# Patient Record
Sex: FEMALE | Race: WHITE | NOT HISPANIC OR LATINO | Employment: FULL TIME | URBAN - METROPOLITAN AREA
[De-identification: names, ages, dates, MRNs, and addresses within clinical notes are randomized per-mention and may not be internally consistent; named-entity substitution may affect disease eponyms.]

---

## 2018-09-18 ENCOUNTER — OFFICE VISIT (OUTPATIENT)
Dept: INTERNAL MEDICINE | Facility: CLINIC | Age: 28
End: 2018-09-18

## 2018-09-18 VITALS
HEART RATE: 84 BPM | HEIGHT: 64 IN | SYSTOLIC BLOOD PRESSURE: 110 MMHG | DIASTOLIC BLOOD PRESSURE: 72 MMHG | WEIGHT: 122 LBS | BODY MASS INDEX: 20.83 KG/M2

## 2018-09-18 DIAGNOSIS — Z30.011 ENCOUNTER FOR INITIAL PRESCRIPTION OF CONTRACEPTIVE PILLS: ICD-10-CM

## 2018-09-18 DIAGNOSIS — K21.9 GASTROESOPHAGEAL REFLUX DISEASE WITHOUT ESOPHAGITIS: Primary | ICD-10-CM

## 2018-09-18 DIAGNOSIS — J30.2 CHRONIC SEASONAL ALLERGIC RHINITIS, UNSPECIFIED TRIGGER: ICD-10-CM

## 2018-09-18 PROCEDURE — 99204 OFFICE O/P NEW MOD 45 MIN: CPT | Performed by: NURSE PRACTITIONER

## 2018-09-18 RX ORDER — NORETHINDRONE ACETATE AND ETHINYL ESTRADIOL 1MG-20(21)
1 KIT ORAL DAILY
Qty: 90 TABLET | Refills: 3 | Status: SHIPPED | OUTPATIENT
Start: 2018-09-18 | End: 2019-02-13 | Stop reason: SDUPTHER

## 2018-09-18 RX ORDER — AZELASTINE 1 MG/ML
2 SPRAY, METERED NASAL 2 TIMES DAILY
COMMUNITY
End: 2018-11-13 | Stop reason: SDUPTHER

## 2018-09-18 RX ORDER — FLUTICASONE PROPIONATE 50 MCG
2 SPRAY, SUSPENSION (ML) NASAL DAILY
COMMUNITY
End: 2018-11-13 | Stop reason: SDUPTHER

## 2018-09-18 RX ORDER — CETIRIZINE HYDROCHLORIDE 10 MG/1
10 TABLET ORAL DAILY
COMMUNITY
End: 2018-11-13 | Stop reason: SDUPTHER

## 2018-09-18 NOTE — PROGRESS NOTES
Chief Complaint   Patient presents with   • Establish Care   • Contraception       History of Present Illness     28 y.o.female presents to establish care, contraception mgt, and employment forms.  Prior PCP Dr. Hernández.    Hx headaches with tension or sinus headaches; hx of migraines.  Some increase in HA with employment changes.  Takes midol and helps.    Hx GERD; prilosec doesn't help.  Has started taking a probiotic and feeling better; less reflux.    Hx of seasonal allergies; takes zyrtec, flonase, and astelin; well controlled    Last pap Sep 2017; always been normal.      Review of Systems   Constitutional: Negative for chills and fever.   Respiratory: Negative for cough and shortness of breath.    Gastrointestinal: Positive for GERD. Negative for constipation, diarrhea, nausea and vomiting.   Genitourinary: Negative for difficulty urinating.   Musculoskeletal: Negative for myalgias.   Skin: Negative for rash.   Neurological: Positive for headache.         [facility]  The following portions of the patient's history were reviewed and updated as appropriate: allergies, current medications, past family history, past medical history, past social history, past surgical history and problem list.     Past Medical History:   Diagnosis Date   • Acid reflux    • Migraines       Past Surgical History:   Procedure Laterality Date   • WISDOM TOOTH EXTRACTION        Allergies   Allergen Reactions   • Amoxicillin Hives   • Penicillins Hives   • Zithromax [Azithromycin] Hives      Family History   Problem Relation Age of Onset   • No Known Problems Mother    • Hypertension Father    • Breast cancer Maternal Grandmother    • Ovarian cancer Paternal Grandmother       Social History     Social History   • Marital status: Single     Spouse name: N/A   • Number of children: N/A   • Years of education: N/A     Occupational History   • Not on file.     Social History Main Topics   • Smoking status: Never Smoker   • Smokeless tobacco:  "Never Used   • Alcohol use Yes      Comment: one glass of wine per month   • Drug use: No   • Sexual activity: Defer     Other Topics Concern   • Not on file     Social History Narrative   • No narrative on file         Current Outpatient Prescriptions:   •  azelastine (ASTELIN) 0.1 % nasal spray, 2 sprays into the nostril(s) as directed by provider 2 (Two) Times a Day. Use in each nostril as directed, Disp: , Rfl:   •  cetirizine (zyrTEC) 10 MG tablet, Take 10 mg by mouth Daily., Disp: , Rfl:   •  fluticasone (FLONASE) 50 MCG/ACT nasal spray, 2 sprays into the nostril(s) as directed by provider Daily., Disp: , Rfl:   •  Norethin Ace-Eth Estrad-FE (JUNEL FE 1/20 PO), Take  by mouth., Disp: , Rfl:   •  Probiotic Product (PROBIOTIC DAILY PO), Take  by mouth., Disp: , Rfl:     VITALS:  /72   Pulse 84   Ht 162.6 cm (64\")   Wt 55.3 kg (122 lb)   LMP 09/04/2018 (Exact Date)   BMI 20.94 kg/m²     Physical Exam   Constitutional: She is oriented to person, place, and time. She appears well-developed and well-nourished. No distress.   HENT:   Head: Normocephalic.   Right Ear: External ear normal.   Left Ear: External ear normal.   Nose: Nose normal.   Mouth/Throat: Oropharynx is clear and moist.   Eyes: Pupils are equal, round, and reactive to light. EOM are normal.   Neck: Normal range of motion. Neck supple.   Cardiovascular: Normal rate, regular rhythm, normal heart sounds and intact distal pulses.    Pulmonary/Chest: Effort normal and breath sounds normal. No respiratory distress.   Abdominal: Soft. Bowel sounds are normal. There is no tenderness.   Musculoskeletal: Normal range of motion.   Normal ROM all major joints   Neurological: She is alert and oriented to person, place, and time.   Skin: Skin is warm and dry. Capillary refill takes less than 2 seconds. No rash noted.   Psychiatric: She has a normal mood and affect. Her behavior is normal.       LABS  No results found for this or any previous " visit.    ASSESSMENT/PLAN  Hyun was seen today for establish care and contraception.    Diagnoses and all orders for this visit:    Gastroesophageal reflux disease without esophagitis  Comments:  continue probiotic    Encounter for initial prescription of contraceptive pills  -     norethindrone-ethinyl estradiol FE (JUNEL FE 1/20) 1-20 MG-MCG per tablet; Take 1 tablet by mouth Daily.    Chronic seasonal allergic rhinitis, unspecified trigger  Comments:  continue zyrtec, flonase, astelin    Completed work physical form.  She is a .    Patient refused basic blood work.      I discussed the patients findings and my recommendations with patient.     Patient was encouraged to keep me informed of any acute changes, lack of improvement, or any new concerning symptoms.    Patient voiced understanding of all instructions and denied further questions.      FOLLOW-UP  Return in about 1 year (around 9/18/2019), or if symptoms worsen or fail to improve, for Annual.    Electronically signed by:    SHEMAR Pittman  09/18/2018

## 2018-11-06 ENCOUNTER — TELEPHONE (OUTPATIENT)
Dept: INTERNAL MEDICINE | Facility: CLINIC | Age: 28
End: 2018-11-06

## 2018-11-06 NOTE — TELEPHONE ENCOUNTER
CALL FROM PT STATING THAT WHEN SHE WENT THIS MONTH TO  HER BIRTH CONTROL IT WAS A DIFFERENT BRAND SHE STATES THAT THE PHARMACY TOLD HER IT WAS PROBABLY JUST THE GENERIC BRAND BUT SHE IS WANTING TO MAKE SURE THAT THIS IS OK. CALL BACK NUMBER FOR [phone]  SHE STATES THAT SHE IS SUPPOSED TO START THESE TONIGHT

## 2018-11-06 NOTE — TELEPHONE ENCOUNTER
Let pt know that she was prescribed generic form of her birth control b/c of insurance coverage. Pt verbalized understanding

## 2018-11-13 ENCOUNTER — OFFICE VISIT (OUTPATIENT)
Dept: INTERNAL MEDICINE | Facility: CLINIC | Age: 28
End: 2018-11-13

## 2018-11-13 VITALS
TEMPERATURE: 99.8 F | BODY MASS INDEX: 21.46 KG/M2 | OXYGEN SATURATION: 98 % | SYSTOLIC BLOOD PRESSURE: 116 MMHG | HEART RATE: 86 BPM | WEIGHT: 125 LBS | DIASTOLIC BLOOD PRESSURE: 70 MMHG

## 2018-11-13 DIAGNOSIS — R05.9 COUGH: Primary | ICD-10-CM

## 2018-11-13 DIAGNOSIS — J06.9 UPPER RESPIRATORY TRACT INFECTION, UNSPECIFIED TYPE: ICD-10-CM

## 2018-11-13 PROCEDURE — 99213 OFFICE O/P EST LOW 20 MIN: CPT | Performed by: NURSE PRACTITIONER

## 2018-11-13 RX ORDER — FLUTICASONE PROPIONATE 50 MCG
2 SPRAY, SUSPENSION (ML) NASAL DAILY
Qty: 16 G | Refills: 1 | Status: SHIPPED | OUTPATIENT
Start: 2018-11-13 | End: 2021-01-25 | Stop reason: SDUPTHER

## 2018-11-13 RX ORDER — AZELASTINE 1 MG/ML
2 SPRAY, METERED NASAL DAILY
Qty: 30 ML | Refills: 0 | Status: SHIPPED | OUTPATIENT
Start: 2018-11-13 | End: 2021-01-25 | Stop reason: SDUPTHER

## 2018-11-13 RX ORDER — CETIRIZINE HYDROCHLORIDE 10 MG/1
10 TABLET ORAL DAILY
Qty: 90 TABLET | Refills: 0 | Status: SHIPPED | OUTPATIENT
Start: 2018-11-13

## 2018-11-13 NOTE — PROGRESS NOTES
Chief Complaint   Patient presents with   • Cough     Pt states has had a cold for 2 weeks and now has a cough that she can not get rid of        History of Present Illness  28 y.o.female presents for cough.    C/o nonproductive cough; onset symptoms about 2 weeks ago with URI congestion, rhinorrhea, post nasal drip cold type symptoms, but cough has lingered and will not go away. Tried zyrtec.  Positive fever.      Review of Systems   Constitutional: Positive for chills and fever. Negative for fatigue.   HENT: Positive for congestion, postnasal drip and rhinorrhea. Negative for sinus pressure, sneezing and sore throat.    Respiratory: Positive for cough. Negative for shortness of breath and wheezing.          [facility]  The following portions of the patient's history were reviewed and updated as appropriate: allergies, current medications, past family history, past medical history, past social history, past surgical history and problem list.     Past Medical History:   Diagnosis Date   • Acid reflux    • Migraines       Past Surgical History:   Procedure Laterality Date   • WISDOM TOOTH EXTRACTION        Allergies   Allergen Reactions   • Azithromycin Hives   • Amoxicillin Hives   • Penicillins Hives      Family History   Problem Relation Age of Onset   • No Known Problems Mother    • Hypertension Father    • Breast cancer Maternal Grandmother    • Ovarian cancer Paternal Grandmother       Social History     Socioeconomic History   • Marital status: Single     Spouse name: Not on file   • Number of children: Not on file   • Years of education: Not on file   • Highest education level: Not on file   Social Needs   • Financial resource strain: Not on file   • Food insecurity - worry: Not on file   • Food insecurity - inability: Not on file   • Transportation needs - medical: Not on file   • Transportation needs - non-medical: Not on file   Occupational History   • Not on file   Tobacco Use   • Smoking status: Never Smoker    • Smokeless tobacco: Never Used   Substance and Sexual Activity   • Alcohol use: Yes     Comment: one glass of wine per month   • Drug use: No   • Sexual activity: Defer   Other Topics Concern   • Not on file   Social History Narrative   • Not on file         Current Outpatient Medications:   •  azelastine (ASTELIN) 0.1 % nasal spray, 2 sprays into the nostril(s) as directed by provider 2 (Two) Times a Day. Use in each nostril as directed, Disp: , Rfl:   •  cetirizine (zyrTEC) 10 MG tablet, Take 10 mg by mouth Daily., Disp: , Rfl:   •  fluticasone (FLONASE) 50 MCG/ACT nasal spray, 2 sprays into the nostril(s) as directed by provider Daily., Disp: , Rfl:   •  norethindrone-ethinyl estradiol FE (JUNEL FE 1/20) 1-20 MG-MCG per tablet, Take 1 tablet by mouth Daily., Disp: 90 tablet, Rfl: 3  •  Probiotic Product (PROBIOTIC DAILY PO), Take  by mouth., Disp: , Rfl:     VITALS:  /70   Pulse 86   Temp 99.8 °F (37.7 °C)   Wt 56.7 kg (125 lb)   SpO2 98%   BMI 21.46 kg/m²     Physical Exam   Constitutional: She is oriented to person, place, and time. She appears well-developed and well-nourished. No distress.   HENT:   Head: Normocephalic.   Right Ear: Tympanic membrane, external ear and ear canal normal.   Left Ear: Tympanic membrane, external ear and ear canal normal.   Nose: Mucosal edema, rhinorrhea and congestion present. Right sinus exhibits no maxillary sinus tenderness and no frontal sinus tenderness. Left sinus exhibits no maxillary sinus tenderness and no frontal sinus tenderness.   Mouth/Throat: Oropharynx is clear and moist.   Cardiovascular: Normal rate, regular rhythm and normal heart sounds.   Pulmonary/Chest: Effort normal and breath sounds normal. No respiratory distress. She has no wheezes. She has no rales.   Lymphadenopathy:     She has no cervical adenopathy.   Neurological: She is alert and oriented to person, place, and time.   Skin: Skin is warm and dry.       LABS  No results found for this  or any previous visit.    ASSESSMENT/PLAN  Hyun was seen today for cough.    Diagnoses and all orders for this visit:    Cough  -     doxycycline (VIBRAMYCIN) 100 MG capsule; Take 1 capsule by mouth 2 (Two) Times a Day for 10 days.  -     benzonatate (TESSALON PERLES) 100 MG capsule; Take 1 capsule by mouth 3 (Three) Times a Day As Needed for Cough.    Upper respiratory tract infection, unspecified type  -     doxycycline (VIBRAMYCIN) 100 MG capsule; Take 1 capsule by mouth 2 (Two) Times a Day for 10 days.      If worsening of symptoms or no improvement in symptoms or fever > 101.5 patient should contact our office for further evaluation treatment or seek urgent care.    I discussed the patients findings and my recommendations with patient.     Patient was encouraged to keep me informed of any acute changes, lack of improvement, or any new concerning symptoms.    Patient voiced understanding of all instructions and denied further questions.      FOLLOW-UP  Return if symptoms worsen or fail to improve.    Electronically signed by:    SHEMAR Pittman  11/13/2018

## 2018-11-14 ENCOUNTER — TELEPHONE (OUTPATIENT)
Dept: INTERNAL MEDICINE | Facility: CLINIC | Age: 28
End: 2018-11-14

## 2018-11-14 DIAGNOSIS — R05.9 COUGH: Primary | ICD-10-CM

## 2018-11-14 RX ORDER — DOXYCYCLINE HYCLATE 100 MG/1
100 CAPSULE ORAL 2 TIMES DAILY
Qty: 20 CAPSULE | Refills: 0 | Status: SHIPPED | OUTPATIENT
Start: 2018-11-14 | End: 2018-11-14 | Stop reason: DRUGHIGH

## 2018-11-14 RX ORDER — DOXYCYCLINE 100 MG/1
100 CAPSULE ORAL EVERY 12 HOURS SCHEDULED
Qty: 20 CAPSULE | Refills: 0 | Status: SHIPPED | OUTPATIENT
Start: 2018-11-14 | End: 2018-11-24

## 2018-11-14 RX ORDER — BENZONATATE 100 MG/1
100 CAPSULE ORAL 3 TIMES DAILY PRN
Qty: 21 CAPSULE | Refills: 0 | Status: SHIPPED | OUTPATIENT
Start: 2018-11-14 | End: 2019-02-13

## 2018-11-14 NOTE — TELEPHONE ENCOUNTER
ZHEN FROM [facility] CALLED AND HAD A QUESTION ON DOXYCYCLINE BEING SENT OVER.  SHE STATED THATT THE HYCLATE IS NOT COVERED, SHE WANTS TO KNOW IF IT CAN BE SWITCHED TO MONOHYDRATE WHICH IS COVERED.  CAN BE REACHED [phone].

## 2019-02-13 ENCOUNTER — OFFICE VISIT (OUTPATIENT)
Dept: INTERNAL MEDICINE | Facility: CLINIC | Age: 29
End: 2019-02-13

## 2019-02-13 VITALS — HEIGHT: 64 IN | OXYGEN SATURATION: 98 % | WEIGHT: 122 LBS | BODY MASS INDEX: 20.83 KG/M2 | HEART RATE: 82 BPM

## 2019-02-13 DIAGNOSIS — Z30.8 ENCOUNTER FOR OTHER CONTRACEPTIVE MANAGEMENT: ICD-10-CM

## 2019-02-13 DIAGNOSIS — H60.311 ACUTE DIFFUSE OTITIS EXTERNA OF RIGHT EAR: Primary | ICD-10-CM

## 2019-02-13 PROCEDURE — 99213 OFFICE O/P EST LOW 20 MIN: CPT | Performed by: NURSE PRACTITIONER

## 2019-02-13 RX ORDER — OFLOXACIN 3 MG/ML
5 SOLUTION AURICULAR (OTIC) DAILY
Qty: 2 ML | Refills: 0 | Status: SHIPPED | OUTPATIENT
Start: 2019-02-13 | End: 2019-02-20

## 2019-02-13 RX ORDER — NORETHINDRONE ACETATE AND ETHINYL ESTRADIOL 1MG-20(21)
1 KIT ORAL DAILY
Qty: 28 TABLET | Refills: 11 | Status: SHIPPED | OUTPATIENT
Start: 2019-02-13 | End: 2020-01-27 | Stop reason: SDUPTHER

## 2019-02-13 NOTE — PROGRESS NOTES
Chief Complaint   Patient presents with   • Ear Fullness   • Earache       History of Present Illness  28 y.o.female presents for ear fullness and earache    C/o right ear fullness onset yesterday. Then woke her up in middle of night with right ear pain radiating behind ear. No recent URI sx. No fever or headaches.    Needs a refill on birth control pills.  Insurance will no longer do 90 days.  Has been taking without any problems.  History of migraines without aura; well controlled.    Review of Systems   Constitutional: Negative for chills and fever.   HENT: Positive for ear pain. Negative for congestion, ear discharge, postnasal drip, rhinorrhea, sinus pressure and sneezing.         Ear fullness   Neurological: Negative for headache.         [facility]  The following portions of the patient's history were reviewed and updated as appropriate: allergies, current medications, past family history, past medical history, past social history, past surgical history and problem list.       Past Medical History:   Diagnosis Date   • Acid reflux    • Migraines       Past Surgical History:   Procedure Laterality Date   • WISDOM TOOTH EXTRACTION        Allergies   Allergen Reactions   • Azithromycin Hives   • Amoxicillin Hives   • Penicillins Hives      Family History   Problem Relation Age of Onset   • No Known Problems Mother    • Hypertension Father    • Breast cancer Maternal Grandmother    • Ovarian cancer Paternal Grandmother             Current Outpatient Medications:   •  azelastine (ASTELIN) 0.1 % nasal spray, 2 sprays into the nostril(s) as directed by provider Daily. Use in each nostril as directed, Disp: 30 mL, Rfl: 0  •  cetirizine (zyrTEC) 10 MG tablet, Take 1 tablet by mouth Daily., Disp: 90 tablet, Rfl: 0  •  fluticasone (FLONASE) 50 MCG/ACT nasal spray, 2 sprays into the nostril(s) as directed by provider Daily., Disp: 16 g, Rfl: 1  •  norethindrone-ethinyl estradiol FE (JUNEL FE 1/20) 1-20 MG-MCG per tablet, Take  "1 tablet by mouth Daily., Disp: 90 tablet, Rfl: 3  •  Probiotic Product (PROBIOTIC DAILY PO), Take  by mouth., Disp: , Rfl:     VITALS:  Pulse 82   Ht 162.6 cm (64\")   Wt 55.3 kg (122 lb)   SpO2 98%   Breastfeeding? No   BMI 20.94 kg/m²     Physical Exam   Constitutional: She is oriented to person, place, and time. She appears well-developed and well-nourished.   HENT:   Head: Normocephalic.   Right Ear: There is swelling and tenderness. No drainage. No foreign bodies. Tympanic membrane is not injected, not perforated, not erythematous and not bulging. No middle ear effusion.   Left Ear: External ear normal.   Nose: Nose normal.   Mouth/Throat: Oropharynx is clear and moist.   Right ear external canal and pinna erythematous, dry, crusty white.    Eyes: EOM are normal. Pupils are equal, round, and reactive to light.   Neck: Neck supple.   Cardiovascular: Normal rate, regular rhythm and normal heart sounds.   Pulmonary/Chest: Effort normal and breath sounds normal.   Lymphadenopathy:     She has no cervical adenopathy.   Neurological: She is alert and oriented to person, place, and time.   Skin: Skin is warm and dry.       LABS  No results found for this or any previous visit.    ASSESSMENT/PLAN  Hyun was seen today for ear fullness and earache.    Diagnoses and all orders for this visit:    Acute diffuse otitis externa of right ear  -     ofloxacin (FLOXIN) 0.3 % otic solution; Administer 5 drops to the right ear Daily for 7 days.    Encounter for other contraceptive management  -     norethindrone-ethinyl estradiol FE (JUNEL FE 1/20) 1-20 MG-MCG per tablet; Take 1 tablet by mouth Daily.    If worsening of symptoms or no improvement in symptoms or fever > 101.5 patient should contact our office for further evaluation treatment or seek urgent care.    I discussed the patients findings and my recommendations with patient.  Patient was encouraged to keep me informed of any acute changes, lack of improvement, or any " new concerning symptoms.    Patient voiced understanding of all instructions and denied further questions.      FOLLOW-UP  Return if symptoms worsen or fail to improve.    Electronically signed by:    SHEMAR Pittman  02/13/2019

## 2019-02-22 ENCOUNTER — TELEPHONE (OUTPATIENT)
Dept: INTERNAL MEDICINE | Facility: CLINIC | Age: 29
End: 2019-02-22

## 2019-02-22 NOTE — TELEPHONE ENCOUNTER
PATIENT WAS ASKED TO CALL THE OFFICE REGARDING HER EAR THAT SHE WAS SEEN FOR BY KAREN A LITTLE OVER ONE WEEK AGO. SHE HAS HAD THIS EAR CHECKED AGAIN BY THE SCHOOL NURSE WHO TOLD HER IT IS STILL WHITE AND CLOUDY. SHE WOULD LIKE TO KNOW IF KAREN CAN ORDER THE ABX AS DISCUSSED DURING HER VISIT. SHE USED THE EAR DROPS FOR ONE WEEK AS PRESCRIBED.    CALL BACK [phone]

## 2019-02-23 DIAGNOSIS — H66.004 RECURRENT ACUTE SUPPURATIVE OTITIS MEDIA OF RIGHT EAR WITHOUT SPONTANEOUS RUPTURE OF TYMPANIC MEMBRANE: Primary | ICD-10-CM

## 2019-02-23 RX ORDER — DOXYCYCLINE HYCLATE 100 MG/1
100 CAPSULE ORAL 2 TIMES DAILY
Qty: 14 CAPSULE | Refills: 0 | Status: SHIPPED | OUTPATIENT
Start: 2019-02-23 | End: 2019-03-02

## 2019-02-23 NOTE — TELEPHONE ENCOUNTER
Let pt know I sent her doxy abx.  She will need to use a back up method on her birth control while taking this med and one week after completing.  The abx will make her birth control pills not work.

## 2019-03-04 ENCOUNTER — OFFICE VISIT (OUTPATIENT)
Dept: INTERNAL MEDICINE | Facility: CLINIC | Age: 29
End: 2019-03-04

## 2019-03-04 VITALS
BODY MASS INDEX: 20.83 KG/M2 | WEIGHT: 122 LBS | HEART RATE: 83 BPM | DIASTOLIC BLOOD PRESSURE: 74 MMHG | HEIGHT: 64 IN | TEMPERATURE: 98 F | OXYGEN SATURATION: 99 % | SYSTOLIC BLOOD PRESSURE: 116 MMHG

## 2019-03-04 DIAGNOSIS — H61.23 BILATERAL IMPACTED CERUMEN: Primary | ICD-10-CM

## 2019-03-04 PROCEDURE — 99213 OFFICE O/P EST LOW 20 MIN: CPT | Performed by: NURSE PRACTITIONER

## 2019-03-04 NOTE — PROGRESS NOTES
Chief Complaint   Patient presents with   • Ear Fullness       History of Present Illness  29 y.o.female presents for ear fullness.   Patient was seen 2/13 for acute diffuse otitis externa of right ear. Patient states that she no longer has pain or drainage of right ear but still having itching and feeling of ear fullness. She has been using debrox in right ear. She also has been using ofloxacin.  Positive decrease hearing.  No URI sx.      Review of Systems   Constitutional: Negative for chills and fever.   HENT: Positive for hearing loss. Negative for congestion, ear discharge, ear pain, postnasal drip, rhinorrhea and tinnitus.         Ear fullness    Respiratory: Negative for cough.    Neurological: Negative for dizziness and headache.       [facility]  The following portions of the patient's history were reviewed and updated as appropriate: allergies, current medications, past family history, past medical history, past social history, past surgical history and problem list.       Past Medical History:   Diagnosis Date   • Acid reflux    • Migraines       Past Surgical History:   Procedure Laterality Date   • WISDOM TOOTH EXTRACTION        Allergies   Allergen Reactions   • Azithromycin Hives   • Amoxicillin Hives   • Penicillins Hives      Family History   Problem Relation Age of Onset   • No Known Problems Mother    • Hypertension Father    • Breast cancer Maternal Grandmother    • Ovarian cancer Paternal Grandmother             Current Outpatient Medications:   •  azelastine (ASTELIN) 0.1 % nasal spray, 2 sprays into the nostril(s) as directed by provider Daily. Use in each nostril as directed, Disp: 30 mL, Rfl: 0  •  cetirizine (zyrTEC) 10 MG tablet, Take 1 tablet by mouth Daily., Disp: 90 tablet, Rfl: 0  •  fluticasone (FLONASE) 50 MCG/ACT nasal spray, 2 sprays into the nostril(s) as directed by provider Daily., Disp: 16 g, Rfl: 1  •  norethindrone-ethinyl estradiol FE (JUNEL FE 1/20) 1-20 MG-MCG per tablet, Take  "1 tablet by mouth Daily., Disp: 28 tablet, Rfl: 11  •  Probiotic Product (PROBIOTIC DAILY PO), Take  by mouth., Disp: , Rfl:     VITALS:  /74   Pulse 83   Temp 98 °F (36.7 °C)   Ht 162.6 cm (64\")   Wt 55.3 kg (122 lb)   SpO2 99%   Breastfeeding? No   BMI 20.94 kg/m²     Physical Exam   Constitutional: She appears well-developed and well-nourished. No distress.   HENT:   Right Ear: Decreased hearing is noted. cerumen impaction is present.  Left Ear: An impacted cerumen is present.  Right external canal erythematous. Dry, white, flaky bilateral pinna   Cardiovascular: Normal rate, regular rhythm and normal heart sounds.   Pulmonary/Chest: Effort normal and breath sounds normal.   Skin: She is not diaphoretic.   Nursing note and vitals reviewed.  Ear exam after bilateral ear irrigation per MA; cerumen cleared.  ever TM dull nonbulging no erythema.  Right ear canal has some mild residual erythema no drainage.    ASSESSMENT/PLAN  Hyun was seen today for ear fullness.    Diagnoses and all orders for this visit:    Bilateral impacted cerumen    cont zyrtec.  Only needs to use the debrox 1 to 2 drops about once a month to help prevent recurrent cerumen impaction.  Shower as usual.  Let ears dry out. Can apply some plain Vaseline to external pina area do not put in ear canal.  With the way her ears looked last visit and today, almost has an eczema look to it.  I want to see how things settle down now that we have wax removed from ears.  If still having any ear pain or itching she needs to let me know.  Would wear ear plugs with swimming.      I discussed the patients findings and my recommendations with patient.  Patient was encouraged to keep me informed of any acute changes, lack of improvement, or any new concerning symptoms.    Patient voiced understanding of all instructions and denied further questions.      FOLLOW-UP  Return if symptoms worsen or fail to improve.    Electronically signed by:    Bianca PETERSON" SHEMAR Levin  03/04/2019

## 2019-03-11 ENCOUNTER — TELEPHONE (OUTPATIENT)
Dept: INTERNAL MEDICINE | Facility: CLINIC | Age: 29
End: 2019-03-11

## 2019-03-11 DIAGNOSIS — N76.0 ACUTE VAGINITIS: Primary | ICD-10-CM

## 2019-03-11 RX ORDER — FLUCONAZOLE 150 MG/1
150 TABLET ORAL ONCE
Qty: 1 TABLET | Refills: 0 | Status: SHIPPED | OUTPATIENT
Start: 2019-03-11 | End: 2019-03-11

## 2019-03-11 NOTE — TELEPHONE ENCOUNTER
PT SAW KAREN AND WAS PUT ON AN ANTIBIOTIC AND NOW SHE HAS A YEAST INFECTION CAN WE CALL SOMETHING IN FOR HER  PT USES [facility]    PLEASE LET THE PT KNOW [phone]

## 2019-07-28 ENCOUNTER — OFFICE VISIT (OUTPATIENT)
Dept: INTERNAL MEDICINE | Facility: CLINIC | Age: 29
End: 2019-07-28

## 2019-07-28 VITALS
DIASTOLIC BLOOD PRESSURE: 64 MMHG | HEIGHT: 64 IN | HEART RATE: 93 BPM | BODY MASS INDEX: 21.13 KG/M2 | WEIGHT: 123.8 LBS | OXYGEN SATURATION: 99 % | SYSTOLIC BLOOD PRESSURE: 118 MMHG | RESPIRATION RATE: 16 BRPM

## 2019-07-28 DIAGNOSIS — N76.0 ACUTE VAGINITIS: Primary | ICD-10-CM

## 2019-07-28 PROCEDURE — 99213 OFFICE O/P EST LOW 20 MIN: CPT | Performed by: NURSE PRACTITIONER

## 2019-07-28 RX ORDER — FLUCONAZOLE 150 MG/1
150 TABLET ORAL DAILY
Qty: 2 TABLET | Refills: 0 | Status: SHIPPED | OUTPATIENT
Start: 2019-07-28 | End: 2019-07-30

## 2020-01-24 DIAGNOSIS — Z30.8 ENCOUNTER FOR OTHER CONTRACEPTIVE MANAGEMENT: ICD-10-CM

## 2020-01-24 RX ORDER — NORETHINDRONE ACETATE AND ETHINYL ESTRADIOL 1MG-20(21)
KIT ORAL
Qty: 28 TABLET | Refills: 10 | OUTPATIENT
Start: 2020-01-24

## 2020-01-27 ENCOUNTER — TELEPHONE (OUTPATIENT)
Dept: INTERNAL MEDICINE | Facility: CLINIC | Age: 30
End: 2020-01-27

## 2020-01-27 DIAGNOSIS — Z30.8 ENCOUNTER FOR OTHER CONTRACEPTIVE MANAGEMENT: ICD-10-CM

## 2020-01-27 RX ORDER — NORETHINDRONE ACETATE AND ETHINYL ESTRADIOL 1MG-20(21)
1 KIT ORAL DAILY
Qty: 28 TABLET | Refills: 1 | Status: SHIPPED | OUTPATIENT
Start: 2020-01-27 | End: 2020-02-06 | Stop reason: SDUPTHER

## 2020-01-27 NOTE — TELEPHONE ENCOUNTER
Patient received a letter stating that her birth control norethindrone-ethinyl estradiol FE (JUNEL FE 1/20) 1-20 MG-MCG per tablet was denied. Patient is currently out of the medication. She did inform me that her insurance has changed. ( I have updated this in this system). Please call patient at [phone] if there are any questions or concerns.

## 2020-02-06 ENCOUNTER — OFFICE VISIT (OUTPATIENT)
Dept: INTERNAL MEDICINE | Facility: CLINIC | Age: 30
End: 2020-02-06

## 2020-02-06 VITALS
HEIGHT: 64 IN | WEIGHT: 120.2 LBS | TEMPERATURE: 98 F | OXYGEN SATURATION: 99 % | HEART RATE: 109 BPM | BODY MASS INDEX: 20.52 KG/M2 | RESPIRATION RATE: 20 BRPM

## 2020-02-06 DIAGNOSIS — Z30.8 ENCOUNTER FOR OTHER CONTRACEPTIVE MANAGEMENT: ICD-10-CM

## 2020-02-06 DIAGNOSIS — J02.9 SORE THROAT: ICD-10-CM

## 2020-02-06 DIAGNOSIS — R52 BODY ACHES: ICD-10-CM

## 2020-02-06 DIAGNOSIS — J10.1 INFLUENZA B: Primary | ICD-10-CM

## 2020-02-06 LAB
EXPIRATION DATE: ABNORMAL
EXPIRATION DATE: NORMAL
FLUAV AG NPH QL: NEGATIVE
FLUBV AG NPH QL: POSITIVE
INTERNAL CONTROL: ABNORMAL
INTERNAL CONTROL: NORMAL
Lab: ABNORMAL
Lab: NORMAL
S PYO AG THROAT QL: NEGATIVE

## 2020-02-06 PROCEDURE — 87804 INFLUENZA ASSAY W/OPTIC: CPT | Performed by: NURSE PRACTITIONER

## 2020-02-06 PROCEDURE — 99213 OFFICE O/P EST LOW 20 MIN: CPT | Performed by: NURSE PRACTITIONER

## 2020-02-06 PROCEDURE — 87880 STREP A ASSAY W/OPTIC: CPT | Performed by: NURSE PRACTITIONER

## 2020-02-06 RX ORDER — OSELTAMIVIR PHOSPHATE 75 MG/1
75 CAPSULE ORAL 2 TIMES DAILY
Qty: 10 CAPSULE | Refills: 0 | Status: SHIPPED | OUTPATIENT
Start: 2020-02-06 | End: 2020-02-11

## 2020-02-06 RX ORDER — NORETHINDRONE ACETATE AND ETHINYL ESTRADIOL 1MG-20(21)
1 KIT ORAL DAILY
Qty: 84 TABLET | Refills: 3 | Status: SHIPPED | OUTPATIENT
Start: 2020-02-06 | End: 2021-01-25 | Stop reason: SDUPTHER

## 2020-02-06 NOTE — PROGRESS NOTES
Chief Complaint   Patient presents with   • Sinusitis   • Headache       History of Present Illness    Hyun Rosario is a 29 y.o. female who presents today for flu like symptoms.    Influenza   This is a new problem. Episode onset: 2 days ago. The problem occurs constantly. The problem has been gradually worsening. Associated symptoms include anorexia, chills, congestion, coughing, fatigue, headaches, myalgias and a sore throat. Pertinent negatives include no abdominal pain, arthralgias, change in bowel habit, chest pain, diaphoresis, fever, nausea, numbness, rash, swollen glands, vertigo, vomiting or weakness. Nothing aggravates the symptoms. Treatments tried: Midol. The treatment provided mild relief.       PMSF    The following portions of the patient's history were reviewed and updated as appropriate: allergies, current medications, past family history, past medical history, past social history, past surgical history and problem list.     Social History     Tobacco Use   • Smoking status: Never Smoker   • Smokeless tobacco: Never Used   Substance Use Topics   • Alcohol use: Yes     Comment: one glass of wine per month       Past Medical History:   Diagnosis Date   • Acid reflux    • Migraines        Past Surgical History:   Procedure Laterality Date   • WISDOM TOOTH EXTRACTION         Family History   Problem Relation Age of Onset   • No Known Problems Mother    • Hypertension Father    • Breast cancer Maternal Grandmother    • Ovarian cancer Paternal Grandmother        Allergies   Allergen Reactions   • Azithromycin Hives   • Amoxicillin Hives   • Penicillins Hives         Current Outpatient Medications:   •  azelastine (ASTELIN) 0.1 % nasal spray, 2 sprays into the nostril(s) as directed by provider Daily. Use in each nostril as directed, Disp: 30 mL, Rfl: 0  •  cetirizine (zyrTEC) 10 MG tablet, Take 1 tablet by mouth Daily., Disp: 90 tablet, Rfl: 0  •  fluticasone (FLONASE) 50 MCG/ACT nasal spray, 2 sprays into  "the nostril(s) as directed by provider Daily., Disp: 16 g, Rfl: 1  •  Probiotic Product (PROBIOTIC DAILY PO), Take  by mouth., Disp: , Rfl:   •  norethindrone-ethinyl estradiol FE (JUNEL FE 1/20) 1-20 MG-MCG per tablet, Take 1 tablet by mouth Daily., Disp: 84 tablet, Rfl: 3  •  oseltamivir (TAMIFLU) 75 MG capsule, Take 1 capsule by mouth 2 (Two) Times a Day for 5 days., Disp: 10 capsule, Rfl: 0    Review of Systems  Review of Systems   Constitutional: Positive for appetite change, chills and fatigue. Negative for diaphoresis and fever.   HENT: Positive for congestion, postnasal drip, rhinorrhea, sinus pressure, sinus pain and sore throat. Negative for ear pain and sneezing.    Eyes: Negative for visual disturbance.   Respiratory: Positive for cough. Negative for chest tightness, shortness of breath and wheezing.    Cardiovascular: Negative for chest pain and palpitations.   Gastrointestinal: Positive for anorexia. Negative for abdominal pain, change in bowel habit, constipation, diarrhea, nausea and vomiting.   Genitourinary: Negative for dysuria.   Musculoskeletal: Positive for myalgias. Negative for arthralgias.   Skin: Negative for color change and rash.   Neurological: Positive for headaches. Negative for dizziness, vertigo, weakness, light-headedness and numbness.   Psychiatric/Behavioral: Negative for sleep disturbance.       Vitals:  Vitals:    02/06/20 1008   Pulse: 109   Resp: 20   Temp: 98 °F (36.7 °C)   TempSrc: Temporal   SpO2: 99%   Weight: 54.5 kg (120 lb 3.2 oz)   Height: 162.6 cm (64\")   PainSc:   5   PainLoc: Throat       Physical Exam  Physical Exam   Constitutional: She is oriented to person, place, and time. She appears well-developed and well-nourished.   HENT:   Head: Normocephalic and atraumatic.   Right Ear: Tympanic membrane, external ear and ear canal normal.   Left Ear: Tympanic membrane, external ear and ear canal normal.   Nose: Mucosal edema, rhinorrhea and congestion present. No nasal " deformity. Right sinus exhibits maxillary sinus tenderness. Right sinus exhibits no frontal sinus tenderness. Left sinus exhibits maxillary sinus tenderness. Left sinus exhibits no frontal sinus tenderness.   Mouth/Throat: Uvula is midline and mucous membranes are normal. Posterior oropharyngeal erythema present. No oropharyngeal exudate or posterior oropharyngeal edema. No tonsillar exudate.   Eyes: Pupils are equal, round, and reactive to light. Conjunctivae are normal.   Neck: Trachea normal and phonation normal.   Cardiovascular: Normal rate, regular rhythm and normal heart sounds.   Pulmonary/Chest: Effort normal and breath sounds normal. No respiratory distress. She has no decreased breath sounds. She has no wheezes. She has no rhonchi. She has no rales.   Lymphadenopathy:        Head (right side): No submental, no submandibular, no tonsillar, no preauricular and no posterior auricular adenopathy present.        Head (left side): No submental, no submandibular, no tonsillar, no preauricular and no posterior auricular adenopathy present.     She has no cervical adenopathy.   Neurological: She is alert and oriented to person, place, and time.   Skin: Skin is warm and dry. No rash noted.   Psychiatric: She has a normal mood and affect. Her behavior is normal.   Nursing note and vitals reviewed.      Labs  Results for orders placed or performed in visit on 02/06/20   POC Influenza A / B   Result Value Ref Range    Rapid Influenza A Ag Negative Negative    Rapid Influenza B Ag Positive (A) Negative    Internal Control Passed Passed    Lot Number [id number]     Expiration Date 5/13/2022    POC Rapid Strep A   Result Value Ref Range    Rapid Strep A Screen Negative Negative, VALID, INVALID, Not Performed    Internal Control Passed Passed    Lot Number [id number]     Expiration Date 4/30/2021        Assessment/Plan    Hyun was seen today for sinusitis and headache.    Diagnoses and all orders for this  visit:    Influenza B  -     oseltamivir (TAMIFLU) 75 MG capsule; Take 1 capsule by mouth 2 (Two) Times a Day for 5 days.    Body aches  -     POC Influenza A / B    Sore throat  -     POC Rapid Strep A    Discussed viral vs. Bacterial etiology. Influenza is viral. Take antiviral medications as prescribed. Patient advised to seek symptomatic relief with OTC cough and cold preparations as indicated, Tylenol/Ibuprofen for fever, headache, body aches. Encouraged adequate rest and fluid intake. Advised that flu can be contagious before symptoms begin and up to 5-7 after symptoms first noticed. Universal precautions recommended including appropriate handwashing and hygiene measures to prevent the spread of virus. Follow-up for new, worsening, or persistent symptoms.    Plan of care reviewed with patient at conclusion of today's visit. Patient education was provided regarding diagnosis, management, and prescribed or recommended OTC medications. Patient was informed to notify office of any new, worsening, or persistent symptoms. Patient verbalized understanding and agreement with plan of care.     Follow-Up  Return if symptoms worsen or fail to improve.      Electronically Signed By:  SHEMAR Soria/Transcription Disclaimer:  Much of this encounter note is an electronic transcription/translation of spoken language to printed text.  The electronic translation of spoken language may permit erroneous, or at times, nonsensical words or phrases to be inadvertently transcribed.  Although I have reviewed the note for such errors, some may still exist.

## 2021-01-21 DIAGNOSIS — Z30.8 ENCOUNTER FOR OTHER CONTRACEPTIVE MANAGEMENT: ICD-10-CM

## 2021-01-21 RX ORDER — NORETHINDRONE ACETATE AND ETHINYL ESTRADIOL 1MG-20(21)
KIT ORAL
Qty: 84 TABLET | Refills: 2 | OUTPATIENT
Start: 2021-01-21

## 2021-01-25 ENCOUNTER — OFFICE VISIT (OUTPATIENT)
Dept: INTERNAL MEDICINE | Facility: CLINIC | Age: 31
End: 2021-01-25

## 2021-01-25 DIAGNOSIS — Z30.8 ENCOUNTER FOR OTHER CONTRACEPTIVE MANAGEMENT: ICD-10-CM

## 2021-01-25 DIAGNOSIS — J30.89 ENVIRONMENTAL AND SEASONAL ALLERGIES: Primary | ICD-10-CM

## 2021-01-25 DIAGNOSIS — Z30.41 ENCOUNTER FOR SURVEILLANCE OF CONTRACEPTIVE PILLS: ICD-10-CM

## 2021-01-25 PROCEDURE — 99442 PR PHYS/QHP TELEPHONE EVALUATION 11-20 MIN: CPT | Performed by: NURSE PRACTITIONER

## 2021-01-25 RX ORDER — FLUTICASONE PROPIONATE 50 MCG
2 SPRAY, SUSPENSION (ML) NASAL DAILY
Qty: 16 G | Refills: 5 | Status: SHIPPED | OUTPATIENT
Start: 2021-01-25 | End: 2021-09-22

## 2021-01-25 RX ORDER — NORETHINDRONE ACETATE AND ETHINYL ESTRADIOL 1MG-20(21)
1 KIT ORAL DAILY
Qty: 84 TABLET | Refills: 3 | OUTPATIENT
Start: 2021-01-25 | End: 2022-01-25

## 2021-01-25 RX ORDER — AZELASTINE 1 MG/ML
2 SPRAY, METERED NASAL DAILY
Qty: 30 ML | Refills: 5 | Status: SHIPPED | OUTPATIENT
Start: 2021-01-25 | End: 2021-09-22

## 2021-01-25 RX ORDER — NORETHINDRONE ACETATE AND ETHINYL ESTRADIOL 1MG-20(21)
1 KIT ORAL DAILY
Qty: 84 TABLET | Refills: 3 | Status: SHIPPED | OUTPATIENT
Start: 2021-01-25 | End: 2021-06-23 | Stop reason: SDUPTHER

## 2021-01-25 NOTE — PROGRESS NOTES
Chief Complaint   Patient presents with   • Contraception   • Allergies     You have chosen to receive care through a telephone visit. Do you consent to use a telephone visit for your medical care today? Yes    History of Present Illness    30 y.o.female presents for contraception and allergies medication refill.  Contraception patient takes Junel VALERIE daily without difficulties.  Non-smoker.  No history of DVT or blood clots.  No breast complaints.  Occasional migraine no aura.  Menstrual periods are regular without difficulties.  No pelvic pain, flank pain vaginal discharge or dysuria.  Her allergies have been bothering her more than usual this winter/year with rhinorrhea nasal congestion.  Takes Flonase and Astelin nasal spray.  Tolerates well no epistaxis.  Needs refill.      ROS: any positive referenced above.   Other ROS negative.      [facility]  The following portions of the patient's history were reviewed and updated as appropriate: allergies, current medications, past family history, past medical history, past social history, past surgical history and problem list.     Past Medical History:   Diagnosis Date   • Acid reflux    • Migraines       Allergies   Allergen Reactions   • Azithromycin Hives   • Amoxicillin Hives   • Penicillins Hives      Social History     Tobacco Use   • Smoking status: Never Smoker   • Smokeless tobacco: Never Used   Substance Use Topics   • Alcohol use: Yes     Comment: one glass of wine per month   • Drug use: No         Current Outpatient Medications:   •  azelastine (ASTELIN) 0.1 % nasal spray, 2 sprays into the nostril(s) as directed by provider Daily. Use in each nostril as directed, Disp: 30 mL, Rfl: 0  •  cetirizine (zyrTEC) 10 MG tablet, Take 1 tablet by mouth Daily., Disp: 90 tablet, Rfl: 0  •  fluticasone (FLONASE) 50 MCG/ACT nasal spray, 2 sprays into the nostril(s) as directed by provider Daily., Disp: 16 g, Rfl: 1  •  norethindrone-ethinyl estradiol FE (JUNEL FE 1/20)  1-20 MG-MCG per tablet, Take 1 tablet by mouth Daily., Disp: 84 tablet, Rfl: 3  •  Probiotic Product (PROBIOTIC DAILY PO), Take  by mouth., Disp: , Rfl:     VITALS:  Physical Exam  telehealth  Assessment and Plan    Diagnoses and all orders for this visit:    1. Environmental and seasonal allergies (Primary)  -     azelastine (ASTELIN) 0.1 % nasal spray; 2 sprays into the nostril(s) as directed by provider Daily. Use in each nostril as directed  Dispense: 30 mL; Refill: 5  -     fluticasone (FLONASE) 50 MCG/ACT nasal spray; 2 sprays into the nostril(s) as directed by provider Daily.  Dispense: 16 g; Refill: 5    2. Encounter for other contraceptive management  -     norethindrone-ethinyl estradiol FE (Junel FE 1/20) 1-20 MG-MCG per tablet; Take 1 tablet by mouth Daily.  Dispense: 84 tablet; Refill: 3      This visit has been rescheduled as a phone visit to comply with patient safety concerns in accordance with CDC recommendations. Total time of discussion was 15 minutes.  '    Follow Up   Return in about 1 year (around 1/25/2022), or if symptoms worsen or fail to improve, for Annual.      I discussed the patients findings and my recommendations with patient.  Patient was encouraged to keep me informed of any acute changes, lack of improvement, or any new concerning symptoms.  Patient voiced understanding of all instructions and denied further questions.    Electronically signed by:    SHEMAR Pittman  01/25/2021    EMR Dragon/Transcription Disclaimer:  Much of this encounter note is an electronic transcription/translation of spoken language to printed text.  The electronic translation of spoken language may permit erroneous, or at times, nonsensical words or phrases to be inadvertently transcribed.  Although I have reviewed the note for such errors, some may still exist

## 2021-02-22 ENCOUNTER — OFFICE VISIT (OUTPATIENT)
Dept: INTERNAL MEDICINE | Facility: CLINIC | Age: 31
End: 2021-02-22

## 2021-02-22 VITALS
BODY MASS INDEX: 21.26 KG/M2 | HEIGHT: 63 IN | WEIGHT: 120 LBS | DIASTOLIC BLOOD PRESSURE: 80 MMHG | SYSTOLIC BLOOD PRESSURE: 128 MMHG | TEMPERATURE: 97.1 F | OXYGEN SATURATION: 98 % | HEART RATE: 138 BPM

## 2021-02-22 DIAGNOSIS — S16.1XXA ACUTE STRAIN OF NECK MUSCLE, INITIAL ENCOUNTER: ICD-10-CM

## 2021-02-22 DIAGNOSIS — M77.8 LEFT SHOULDER TENDONITIS: Primary | ICD-10-CM

## 2021-02-22 PROCEDURE — 99213 OFFICE O/P EST LOW 20 MIN: CPT | Performed by: NURSE PRACTITIONER

## 2021-02-22 NOTE — PROGRESS NOTES
Chief Complaint   Patient presents with   • Neck Pain     Pt c/o pain in neck, shoulder, and arm. States been going on for few weeks, having tingling/numbness, abdirahman during working out/doing activities   • Arm Pain       History of Present Illness      Hyun Rosario is a 30 y.o. 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 When in person symptoms not as sever as she is pushing cart and looking down all day at ipad. Also does photography and carrying heavy camera which may have exacerbated symptoms yesterday. Thinks symptoms have been causing headaches. Feels she has been stressed a lot over the last year and concerned that ongoing symptoms may be due to tension. Has chronic migraines for which she is using a diary to document triggers and frequency. Taking midol daily over the last couple of days with some relief of symptoms. Trying to do yoga to counter symptoms. Spasms occur at nighttime. Notes numbness and tingling sensation of lower arm and fingers. Neck stiffness more pronounced last night, unable to get to relax. Used ice with mild relief.     Answers for HPI/ROS submitted by the patient on 2/22/2021   What is the primary reason for your visit?: Other  Please describe your symptoms.: Neck, shoulder, pain. Numb and tingling in left arm and hand. Migraines  Have you had these symptoms before?: No  How long have you been having these symptoms?: Greater than 2 weeks  Please list any medications you are currently taking for this condition.: Midol  Please describe any probable cause for these symptoms. 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 Virtual is really rough because I'm sitting at a computer all day. In person is rough because I'm pushing a cart around and still looking at an iPad screen for a lot of the day. I've been trying  to do yoga to relax it but I guess it's possible that that hurt it. Needless to say, I've been extremely stressed the past year, so it could have to do with tension. I get migraines so it didn't necessarily make me think something was wrong, but I have been getting migraines more frequently recently.    Review of Systems  Review of Systems   Eyes: Negative for visual disturbance.   Respiratory: Negative for cough and shortness of breath.    Cardiovascular: Negative for chest pain and palpitations.   Musculoskeletal: Positive for myalgias, neck pain and neck stiffness. Negative for arthralgias, back pain and joint swelling.   Neurological: Positive for numbness. Negative for dizziness, tremors, weakness, light-headedness and headache.         [facility]  The following portions of the patient's history were reviewed and updated as appropriate: allergies, current medications, past family history, past medical history, past social history, past surgical history and problem list.     Past Medical History:   Diagnosis Date   • Acid reflux    • Migraines      Social History     Tobacco Use   • Smoking status: Never Smoker   • Smokeless tobacco: Never Used   Substance Use Topics   • Alcohol use: Yes     Comment: one glass of wine per month     Past Surgical History:   Procedure Laterality Date   • WISDOM TOOTH EXTRACTION       Family History   Problem Relation Age of Onset   • No Known Problems Mother    • Hypertension Father    • Breast cancer Maternal Grandmother    • Ovarian cancer Paternal Grandmother      Allergies   Allergen Reactions   • Azithromycin Hives   • Amoxicillin Hives   • Penicillins Hives         Current Outpatient Medications:   •  azelastine (ASTELIN) 0.1 % nasal spray, 2 sprays into the nostril(s) as directed by provider Daily. Use in each nostril as directed, Disp: 30 mL, Rfl: 5  •  cetirizine (zyrTEC) 10 MG tablet, Take 1 tablet by mouth Daily., Disp: 90 tablet, Rfl: 0  •  fluticasone (FLONASE) 50 MCG/ACT  "nasal spray, 2 sprays into the nostril(s) as directed by provider Daily., Disp: 16 g, Rfl: 5  •  norethindrone-ethinyl estradiol FE (Junel FE 1/20) 1-20 MG-MCG per tablet, Take 1 tablet by mouth Daily., Disp: 84 tablet, Rfl: 3  •  Probiotic Product (PROBIOTIC DAILY PO), Take  by mouth., Disp: , Rfl:     Objective   Vitals:  Vitals:    02/22/21 1250   BP: 128/80   Pulse: (!) 138   Temp: 97.1 °F (36.2 °C)   SpO2: 98%   Weight: 54.4 kg (120 lb)   Height: 160 cm (63\")   PainSc:   4       Physical Exam  Physical Exam  Vitals signs and nursing note reviewed.   Constitutional:       Appearance: Normal appearance. She is well-developed. She is not ill-appearing or toxic-appearing.   HENT:      Head: Normocephalic and atraumatic.   Eyes:      Conjunctiva/sclera: Conjunctivae normal.      Pupils: Pupils are equal, round, and reactive to light.   Neck:      Musculoskeletal: Normal range of motion and neck supple. Normal range of motion. Pain with movement and muscular tenderness present. No edema, erythema, neck rigidity, crepitus, injury, torticollis or spinous process tenderness.   Pulmonary:      Effort: Pulmonary effort is normal.   Musculoskeletal:         General: Tenderness present.      Left shoulder: She exhibits normal range of motion, no tenderness, no bony tenderness, no swelling, no effusion, no crepitus, no deformity, no laceration, no pain, no spasm, normal pulse and normal strength.      Cervical back: She exhibits tenderness. She exhibits normal range of motion, no bony tenderness, no swelling, no edema, no deformity, no laceration, no pain, no spasm and normal pulse.      Comments: Full ROM of cervical spine and left shoulder. There is tenderness to palpation of trapezius musculature. No decreased  strength noted on PE. Normal arc sign without pain, normal shoulder shrug without pain, normal drop arm test.   Skin:     General: Skin is warm and dry.      Findings: No rash.   Neurological:      Mental " Status: She is alert and oriented to person, place, and time.      Sensory: Sensation is intact. No sensory deficit.      Motor: Motor function is intact. No weakness, tremor or abnormal muscle tone.      Coordination: Coordination is intact.   Psychiatric:         Behavior: Behavior normal. Behavior is cooperative.       Assessment and Plan    Diagnoses and all orders for this visit:    1. Left shoulder tendonitis (Primary)  Symptoms consistent with left shoulder tendinitis likely due to nonergonomic positioning for work.  Recommend continued use of ibuprofen 600-800 mg as needed, no more than 2400 mg daily; heat/ice; rest; topical analgesics such as capsaicin or icy hot.  Continue with routine stretching maneuvers and avoidance of heavy lifting, pushing, pulling until symptoms subside.    2. Acute strain of neck muscle, initial encounter  Patient encouraged to try to perform work tasks at eye level to avoid further strain of neck.  Encouraged continued routine stretching maneuvers to avoid further injury.  May continue to use ibuprofen/acetaminophen for acute pain as well as massage therapy, chiropractic manipulation, heat/ice, rest, and topical analgesics as needed.  Follow-up in office if symptoms worsening or no improvement as expected.    I spent 15 minutes caring for Hyun on this date of service. This time includes time spent by me in the following activities:preparing for the visit, performing a medically appropriate examination and/or evaluation , counseling and educating the patient/family/caregiver and documenting information in the medical record.    Follow Up   Return if symptoms worsen or fail to improve.    Plan of care reviewed with patient at conclusion of today's visit. Patient education was provided regarding diagnosis, management, and prescribed or recommended OTC medications. Patient was informed to notify office of any new, worsening, or persistent symptoms. Patient verbalized understanding and  agreement with plan of care.     Electronically Signed By:  SHEMAR Soria  02/22/2021    EMR Dragon/Transcription Disclaimer:  Please note that portions of this encounter note were completed using electronic transcription/translation of spoken language to printed text.  The electronic transcription/translation of spoken language may permit erroneous, or at times, nonsensical words or phrases to be inadvertently transcribed.  Although I have reviewed the note for such errors, some may still exist in this documentation.

## 2021-03-05 ENCOUNTER — TELEPHONE (OUTPATIENT)
Dept: INTERNAL MEDICINE | Facility: CLINIC | Age: 31
End: 2021-03-05

## 2021-03-05 DIAGNOSIS — M54.2 NECK PAIN: Primary | ICD-10-CM

## 2021-03-05 NOTE — TELEPHONE ENCOUNTER
Caller: [facility] [facility] ON BEHALF OF    Hyun Rosario    Relationship: Self    Best call back number: [phone]     What orders are you requesting (i.e. lab or imaging): IMAGING OF NECK - CT SCAN- RECOMMENDED BY [facility] PH: [phone] -      In what timeframe would the patient need to come in: ASAP     Where will you receive your lab/imaging services:   [address]  FAX: [phone]     Additional notes: PATIENT REQUESTED CALL WHEN ORDERS ARE ACTIVE.  ELGIN STATED THAT SHE HAS GONE TO PT TWICE AND THEY RECOMMENDED IMAGING BEFORE THEY GO ANY FURTHER.

## 2021-03-11 ENCOUNTER — HOSPITAL ENCOUNTER (OUTPATIENT)
Dept: CT IMAGING | Facility: HOSPITAL | Age: 31
Discharge: HOME | End: 2021-03-11
Admitting: NURSE PRACTITIONER

## 2021-03-11 ENCOUNTER — TELEPHONE (OUTPATIENT)
Dept: INTERNAL MEDICINE | Facility: CLINIC | Age: 31
End: 2021-03-11

## 2021-03-11 DIAGNOSIS — M54.2 NECK PAIN: ICD-10-CM

## 2021-03-11 PROCEDURE — 72125 CT NECK SPINE W/O DYE: CPT

## 2021-03-11 NOTE — TELEPHONE ENCOUNTER
Caller: Hyun Rosario    Relationship: Self    Best call back number: [phone]    Caller requesting test results: YES    What test was performed: CT SCAN    When was the test performed: 03/11    Where was the test performed:  [facility]    Additional notes:

## 2021-03-13 ENCOUNTER — TELEPHONE (OUTPATIENT)
Dept: INTERNAL MEDICINE | Facility: CLINIC | Age: 31
End: 2021-03-13

## 2021-03-13 RX ORDER — TIZANIDINE HYDROCHLORIDE 2 MG/1
2 CAPSULE, GELATIN COATED ORAL 3 TIMES DAILY PRN
Qty: 60 CAPSULE | Refills: 1 | Status: SHIPPED | OUTPATIENT
Start: 2021-03-13

## 2021-03-13 NOTE — TELEPHONE ENCOUNTER
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 PHARMACY IS [address] PATIENT CAN BE REACHED [phone]

## 2021-06-23 ENCOUNTER — OFFICE VISIT (OUTPATIENT)
Dept: OBSTETRICS AND GYNECOLOGY | Facility: CLINIC | Age: 31
End: 2021-06-23

## 2021-06-23 VITALS
HEIGHT: 63 IN | DIASTOLIC BLOOD PRESSURE: 72 MMHG | WEIGHT: 127 LBS | SYSTOLIC BLOOD PRESSURE: 124 MMHG | BODY MASS INDEX: 22.5 KG/M2

## 2021-06-23 DIAGNOSIS — Z30.41 ENCOUNTER FOR SURVEILLANCE OF CONTRACEPTIVE PILLS: ICD-10-CM

## 2021-06-23 DIAGNOSIS — Z01.419 WOMEN'S ANNUAL ROUTINE GYNECOLOGICAL EXAMINATION: Primary | ICD-10-CM

## 2021-06-23 PROBLEM — Z80.3 FAMILY HISTORY OF BREAST CANCER: Status: ACTIVE | Noted: 2021-06-23

## 2021-06-23 PROCEDURE — 99385 PREV VISIT NEW AGE 18-39: CPT | Performed by: OBSTETRICS & GYNECOLOGY

## 2021-06-23 RX ORDER — NORETHINDRONE ACETATE AND ETHINYL ESTRADIOL 1MG-20(21)
1 KIT ORAL DAILY
Qty: 84 TABLET | Refills: 3 | Status: SHIPPED | OUTPATIENT
Start: 2021-06-23 | End: 2022-06-14 | Stop reason: SDUPTHER

## 2021-06-23 NOTE — PROGRESS NOTES
GYN Annual Exam     CC - Here for annual exam.        HPI  Hyun Rosario is a 31 y.o. female, , who presents for annual well woman exam. Patient's last menstrual period was 06/10/2021 (exact date). Periods are regular every 28 days lasting 3 days .  Dysmenorrhea:mild to moderate better on OCP's, occurring premenstrually and first 1-2 days of flow.  Patient reports problems with: bloating usually right before her periods and the first day of the cycle and PMS.  Also notices dryness on the right labia. Vagasil does help to some degree. There were no changes to her medical or surgical history since her last visit.. Partner Status: Marital Status: single and same partner for 11 days.  She is sexually active. She has not had new partners since her last STD testing. She does not desire STD testing. Pt had to see PCP for BC due to insurance issue but wants Dr. Thomas to refill today.     Additional OB/GYN History   Current contraception: contraceptive methods: OCP (estrogen/progesterone)  Desires to: continue contraception  Last Pap :   Last Completed Pap Smear          Ordered - PAP SMEAR (Every 3 Years) Ordered on 2019  Done    2017  Done - in [place]              History of abnormal Pap smear: no  Family history of uterine, colon, breast, or ovarian cancer: yes - MGM breast cancer and Her mom is Brca positive and had prophylactic mastectomy at age 40.   Performs monthly Self-Breast Exam: yes  Exercises Regularly:yes  Feelings of Anxiety or Depression: yes - no meds needed   Tobacco Usage?: No   OB History        0    Para   0    Term   0       0    AB   0    Living   0       SAB   0    TAB   0    Ectopic   0    Molar   0    Multiple   0    Live Births   0                Health Maintenance   Topic Date Due   • Annual Gynecologic Pelvic and Breast Exam  Never done   • ANNUAL PHYSICAL  Never done   • HEPATITIS C SCREENING  Never done   • INFLUENZA VACCINE  2021   • PAP  "SMEAR  07/24/2022   • TDAP/TD VACCINES (2 - Td or Tdap) 04/16/2023   • COVID-19 Vaccine  Completed   • Pneumococcal Vaccine 0-64  Aged Out       The additional following portions of the patient's history were reviewed and updated as appropriate: current medications, past family history, past medical history, past social history, past surgical history and problem list.    Review of Systems   Constitutional: Negative.    HENT: Negative.    Eyes: Negative.    Respiratory: Negative.    Cardiovascular: Negative.    Gastrointestinal: Negative.    Endocrine: Negative.    Musculoskeletal: Negative.    Allergic/Immunologic: Negative.    Neurological: Negative.    Hematological: Negative.    Psychiatric/Behavioral: Negative.          I have reviewed and agree with the HPI, ROS, and historical information as entered above. Lucinda Thomas MD    Objective   /72   Ht 160 cm (63\")   Wt 57.6 kg (127 lb)   LMP 06/10/2021 (Exact Date)   Breastfeeding No   BMI 22.50 kg/m²     Physical Exam  Vitals and nursing note reviewed. Exam conducted with a chaperone present.   Constitutional:       Appearance: Normal appearance. She is well-developed.   HENT:      Head: Normocephalic and atraumatic.   Neck:      Thyroid: No thyroid mass or thyromegaly.   Cardiovascular:      Rate and Rhythm: Normal rate and regular rhythm.      Heart sounds: No murmur heard.     Pulmonary:      Effort: Pulmonary effort is normal. No retractions.      Breath sounds: Normal breath sounds. No wheezing, rhonchi or rales.   Chest:      Chest wall: No mass or tenderness.      Breasts:         Right: Normal. No mass, nipple discharge, skin change or tenderness.         Left: Normal. No mass, nipple discharge, skin change or tenderness.   Abdominal:      General: Bowel sounds are normal. There is no distension.      Palpations: Abdomen is soft. Abdomen is not rigid. There is no mass.      Tenderness: There is no abdominal tenderness. There is no " guarding or rebound.      Hernia: No hernia is present. There is no hernia in the left inguinal area or right inguinal area.   Genitourinary:     General: Normal vulva.      Exam position: Lithotomy position.      Pubic Area: No rash.       Labia:         Right: No rash, tenderness or lesion.         Left: No rash, tenderness or lesion.       Urethra: No urethral pain or urethral swelling.      Vagina: Normal. No vaginal discharge or lesions.      Cervix: No cervical motion tenderness, discharge, lesion or cervical bleeding.      Uterus: Normal. Not enlarged, not fixed and not tender.       Adnexa:         Right: No mass, tenderness or fullness.          Left: No mass, tenderness or fullness.        Rectum: No external hemorrhoid.   Musculoskeletal:      Cervical back: Normal range of motion. No muscular tenderness.   Skin:     General: Skin is warm and dry.   Neurological:      Mental Status: She is alert and oriented to person, place, and time.   Psychiatric:         Mood and Affect: Mood normal.         Behavior: Behavior normal.            Assessment and Plan    Problem List Items Addressed This Visit     None      Visit Diagnoses     Women's annual routine gynecological examination    -  Primary    Relevant Orders    Pap IG, Rfx HPV ASCU    Encounter for surveillance of contraceptive pills        Relevant Medications    norethindrone-ethinyl estradiol FE (Junel FE 1/20) 1-20 MG-MCG per tablet          1. GYN annual well woman exam.   2. Encouraged use of condoms for STD prevention.  3. OCP's/Vaginal Ring - Discussed side effects of nausea, BTB, headaches, breast tenderness and slight weight gain in the first three cycles.  Understands risks of blood clots, stroke, and theoretical risk of breast cancer.  Denies family history of blood clots.  4. Reviewed monthly self breast exams.  Instructed to call with lumps, pain, or breast discharge.    5. Reviewed exercise as a preventative health measures.    6. Recommended use of Vitamin D replacement and getting adequate calcium in her diet. (1500mg)  7. Reccommended Flu Vaccine in Fall of each year.  8. RTC in 1 year or PRN with problems      Lucinda Thomas MD  06/23/2021

## 2021-07-07 ENCOUNTER — TRANSCRIBE ORDERS (OUTPATIENT)
Dept: LAB | Facility: HOSPITAL | Age: 31
End: 2021-07-07

## 2021-07-07 ENCOUNTER — LAB (OUTPATIENT)
Dept: LAB | Facility: HOSPITAL | Age: 31
End: 2021-07-07

## 2021-07-07 DIAGNOSIS — G43.719 INTRACTABLE CHRONIC MIGRAINE WITHOUT AURA AND WITHOUT STATUS MIGRAINOSUS: Primary | ICD-10-CM

## 2021-07-07 DIAGNOSIS — R53.83 OTHER FATIGUE: ICD-10-CM

## 2021-07-07 DIAGNOSIS — G43.719 INTRACTABLE CHRONIC MIGRAINE WITHOUT AURA AND WITHOUT STATUS MIGRAINOSUS: ICD-10-CM

## 2021-07-07 LAB
ALBUMIN SERPL-MCNC: 4.5 G/DL (ref 3.5–5.2)
ALBUMIN/GLOB SERPL: 1.7 G/DL
ALP SERPL-CCNC: 41 U/L (ref 39–117)
ALT SERPL W P-5'-P-CCNC: 14 U/L (ref 1–33)
ANION GAP SERPL CALCULATED.3IONS-SCNC: 10.4 MMOL/L (ref 5–15)
AST SERPL-CCNC: 20 U/L (ref 1–32)
BASOPHILS # BLD AUTO: 0.02 10*3/MM3 (ref 0–0.2)
BASOPHILS NFR BLD AUTO: 0.3 % (ref 0–1.5)
BILIRUB SERPL-MCNC: 0.3 MG/DL (ref 0–1.2)
BUN SERPL-MCNC: 7 MG/DL (ref 6–20)
BUN/CREAT SERPL: 13.2 (ref 7–25)
CALCIUM SPEC-SCNC: 9.1 MG/DL (ref 8.6–10.5)
CHLORIDE SERPL-SCNC: 105 MMOL/L (ref 98–107)
CO2 SERPL-SCNC: 23.6 MMOL/L (ref 22–29)
CREAT SERPL-MCNC: 0.53 MG/DL (ref 0.57–1)
CRP SERPL-MCNC: <0.3 MG/DL (ref 0–0.5)
DEPRECATED RDW RBC AUTO: 45.1 FL (ref 37–54)
EOSINOPHIL # BLD AUTO: 0.18 10*3/MM3 (ref 0–0.4)
EOSINOPHIL NFR BLD AUTO: 2.8 % (ref 0.3–6.2)
ERYTHROCYTE [DISTWIDTH] IN BLOOD BY AUTOMATED COUNT: 14.3 % (ref 12.3–15.4)
ERYTHROCYTE [SEDIMENTATION RATE] IN BLOOD: 24 MM/HR (ref 0–20)
FOLATE SERPL-MCNC: >20 NG/ML (ref 4.78–24.2)
GFR SERPL CREATININE-BSD FRML MDRD: 136 ML/MIN/1.73
GLOBULIN UR ELPH-MCNC: 2.7 GM/DL
GLUCOSE SERPL-MCNC: 82 MG/DL (ref 65–99)
HCT VFR BLD AUTO: 39.8 % (ref 34–46.6)
HGB BLD-MCNC: 12.9 G/DL (ref 12–15.9)
IMM GRANULOCYTES # BLD AUTO: 0.01 10*3/MM3 (ref 0–0.05)
IMM GRANULOCYTES NFR BLD AUTO: 0.2 % (ref 0–0.5)
LYMPHOCYTES # BLD AUTO: 1.87 10*3/MM3 (ref 0.7–3.1)
LYMPHOCYTES NFR BLD AUTO: 29.5 % (ref 19.6–45.3)
MAGNESIUM SERPL-MCNC: 2.1 MG/DL (ref 1.6–2.6)
MCH RBC QN AUTO: 28 PG (ref 26.6–33)
MCHC RBC AUTO-ENTMCNC: 32.4 G/DL (ref 31.5–35.7)
MCV RBC AUTO: 86.5 FL (ref 79–97)
MONOCYTES # BLD AUTO: 0.48 10*3/MM3 (ref 0.1–0.9)
MONOCYTES NFR BLD AUTO: 7.6 % (ref 5–12)
NEUTROPHILS NFR BLD AUTO: 3.77 10*3/MM3 (ref 1.7–7)
NEUTROPHILS NFR BLD AUTO: 59.6 % (ref 42.7–76)
NRBC BLD AUTO-RTO: 0 /100 WBC (ref 0–0.2)
PLATELET # BLD AUTO: 300 10*3/MM3 (ref 140–450)
PMV BLD AUTO: 11.7 FL (ref 6–12)
POTASSIUM SERPL-SCNC: 4 MMOL/L (ref 3.5–5.2)
PROT SERPL-MCNC: 7.2 G/DL (ref 6–8.5)
RBC # BLD AUTO: 4.6 10*6/MM3 (ref 3.77–5.28)
SODIUM SERPL-SCNC: 139 MMOL/L (ref 136–145)
TSH SERPL DL<=0.05 MIU/L-ACNC: 2.67 UIU/ML (ref 0.27–4.2)
VIT B12 BLD-MCNC: 382 PG/ML (ref 211–946)
WBC # BLD AUTO: 6.33 10*3/MM3 (ref 3.4–10.8)

## 2021-07-07 PROCEDURE — 82746 ASSAY OF FOLIC ACID SERUM: CPT

## 2021-07-07 PROCEDURE — 80053 COMPREHEN METABOLIC PANEL: CPT

## 2021-07-07 PROCEDURE — 85652 RBC SED RATE AUTOMATED: CPT

## 2021-07-07 PROCEDURE — 82607 VITAMIN B-12: CPT

## 2021-07-07 PROCEDURE — 83735 ASSAY OF MAGNESIUM: CPT

## 2021-07-07 PROCEDURE — 36415 COLL VENOUS BLD VENIPUNCTURE: CPT

## 2021-07-07 PROCEDURE — 86140 C-REACTIVE PROTEIN: CPT

## 2021-07-07 PROCEDURE — 84443 ASSAY THYROID STIM HORMONE: CPT

## 2021-07-07 PROCEDURE — 85025 COMPLETE CBC W/AUTO DIFF WBC: CPT

## 2021-07-24 ENCOUNTER — OFFICE VISIT (OUTPATIENT)
Dept: INTERNAL MEDICINE | Facility: CLINIC | Age: 31
End: 2021-07-24

## 2021-07-24 VITALS
WEIGHT: 130.6 LBS | DIASTOLIC BLOOD PRESSURE: 70 MMHG | HEART RATE: 104 BPM | BODY MASS INDEX: 23.14 KG/M2 | OXYGEN SATURATION: 99 % | SYSTOLIC BLOOD PRESSURE: 102 MMHG | TEMPERATURE: 98.4 F | HEIGHT: 63 IN

## 2021-07-24 DIAGNOSIS — F41.1 GENERALIZED ANXIETY DISORDER: Primary | ICD-10-CM

## 2021-07-24 PROCEDURE — 99213 OFFICE O/P EST LOW 20 MIN: CPT | Performed by: NURSE PRACTITIONER

## 2021-07-24 RX ORDER — MONTELUKAST SODIUM 10 MG/1
TABLET ORAL
COMMUNITY
Start: 2021-07-01

## 2021-07-24 RX ORDER — SUMATRIPTAN 100 MG/1
TABLET, FILM COATED ORAL
COMMUNITY
Start: 2021-06-30 | End: 2021-09-22

## 2021-07-24 RX ORDER — PREDNISONE 20 MG/1
TABLET ORAL
COMMUNITY
Start: 2021-07-22 | End: 2021-08-17

## 2021-07-24 RX ORDER — FAMOTIDINE 20 MG/1
TABLET, FILM COATED ORAL
COMMUNITY
Start: 2021-07-22 | End: 2021-08-17

## 2021-07-24 RX ORDER — SERTRALINE HYDROCHLORIDE 25 MG/1
25 TABLET, FILM COATED ORAL DAILY
Qty: 30 TABLET | Refills: 0 | Status: SHIPPED | OUTPATIENT
Start: 2021-07-24 | End: 2021-08-17

## 2021-07-24 NOTE — PROGRESS NOTES
Chief Complaint   Patient presents with   • Medication Problem     questions with regard to medications contraindicating   • Anxiety     therapist suggested medication for generalized anxiety disorder       History of Present Illness    31 y.o.female presents for anxiety. Chronic onset aprox 6 years. Has been seeing therapist about same time. Has never been on anx meds.  Recently feeling like anxiety getting worse. Therapist recommended consideration of meds, so she wanted to discuss med options. Not much depression; mostly anxiety.     Review of Systems   Constitutional: Negative for fatigue.   Respiratory: Negative for shortness of breath.    Cardiovascular: Negative for chest pain and palpitations.   Psychiatric/Behavioral: Negative for self-injury, suicidal ideas and depressed mood. The patient is nervous/anxious.           [facility]  The following portions of the patient's history were reviewed and updated as appropriate: allergies, current medications, past family history, past medical history, past social history, past surgical history and problem list.     Past Medical History:   Diagnosis Date   • Acid reflux    • Generalized anxiety disorder 7/27/2021   • H/O seasonal allergies     grass and dust   • Migraines       Allergies   Allergen Reactions   • Azithromycin Hives   • Amoxicillin Hives      Social History     Tobacco Use   • Smoking status: Never Smoker   • Smokeless tobacco: Never Used   Vaping Use   • Vaping Use: Never used   Substance Use Topics   • Alcohol use: Yes     Comment: one glass of wine per month   • Drug use: No     Past Surgical History:   Procedure Laterality Date   • WISDOM TOOTH EXTRACTION        Family History   Problem Relation Age of Onset   • No Known Problems Mother    • Hypertension Father    • Breast cancer Maternal Grandmother    • Ovarian cancer Paternal Grandmother            Current Outpatient Medications:   •  azelastine (ASTELIN) 0.1 % nasal spray, 2 sprays into the  "nostril(s) as directed by provider Daily. Use in each nostril as directed, Disp: 30 mL, Rfl: 5  •  cetirizine (zyrTEC) 10 MG tablet, Take 1 tablet by mouth Daily., Disp: 90 tablet, Rfl: 0  •  famotidine (PEPCID) 20 MG tablet, , Disp: , Rfl:   •  fluticasone (FLONASE) 50 MCG/ACT nasal spray, 2 sprays into the nostril(s) as directed by provider Daily., Disp: 16 g, Rfl: 5  •  montelukast (SINGULAIR) 10 MG tablet, , Disp: , Rfl:   •  norethindrone-ethinyl estradiol FE (Junel FE 1/20) 1-20 MG-MCG per tablet, Take 1 tablet by mouth Daily., Disp: 84 tablet, Rfl: 3  •  predniSONE (DELTASONE) 20 MG tablet, , Disp: , Rfl:   •  Probiotic Product (PROBIOTIC DAILY PO), Take  by mouth., Disp: , Rfl:   •  SUMAtriptan (IMITREX) 100 MG tablet, , Disp: , Rfl:   •  TiZANidine (ZANAFLEX) 2 MG capsule, Take 1 capsule by mouth 3 (Three) Times a Day As Needed for Muscle Spasms., Disp: 60 capsule, Rfl: 1    VITALS:  /70   Pulse 104   Temp 98.4 °F (36.9 °C)   Ht 160 cm (63\")   Wt 59.2 kg (130 lb 9.6 oz)   SpO2 99%   Breastfeeding No   BMI 23.13 kg/m²     Physical Exam  Vitals reviewed.   HENT:      Head: Normocephalic.   Cardiovascular:      Rate and Rhythm: Normal rate and regular rhythm.      Heart sounds: Normal heart sounds.   Pulmonary:      Effort: Pulmonary effort is normal. No respiratory distress.      Breath sounds: Normal breath sounds.   Neurological:      General: No focal deficit present.      Mental Status: She is alert and oriented to person, place, and time.   Psychiatric:         Mood and Affect: Mood is anxious.         Speech: Speech normal.         Behavior: Behavior normal.         Assessment and Plan    Diagnoses and all orders for this visit:    1. Generalized anxiety disorder (Primary)  -     sertraline (ZOLOFT) 25 MG tablet; Take 1 tablet by mouth Daily.  Dispense: 30 tablet; Refill: 0        Counseled patient regarding multimodal approach with healthy sleep, social activities, counseling, and " medications.  Reviewed medication options and common side effects. Patient would like to proceed with zoloft.  Advised some meds can take several weeks to see improvement in symptoms, and is important to take medication as prescribed.  Some SSRI especially in younger populations can worsen depression symptoms.  If any thoughts of self harm or suicidal ideations, pt should contact our office for immediate evaluation or seek emergency care.    I discussed the patients findings and my recommendations with patient.  Patient was encouraged to keep me informed of any acute changes, lack of improvement, or any new concerning symptoms.  Patient voiced understanding of all instructions and denied further questions.      Follow Up   Return in about 4 weeks (around 8/21/2021), or if symptoms worsen or fail to improve, for Recheck.      Electronically signed by:    SHEMAR Pittman  07/24/2021

## 2021-07-27 PROBLEM — F41.1 GENERALIZED ANXIETY DISORDER: Status: ACTIVE | Noted: 2021-07-27

## 2021-08-09 ENCOUNTER — TELEPHONE (OUTPATIENT)
Dept: INTERNAL MEDICINE | Facility: CLINIC | Age: 31
End: 2021-08-09

## 2021-08-09 NOTE — TELEPHONE ENCOUNTER
Caller: Hyun Rosario    Relationship: Self    Best call back number: [phone]   What medications are you currently taking:   Current Outpatient Medications on File Prior to Visit   Medication Sig Dispense Refill   • azelastine (ASTELIN) 0.1 % nasal spray 2 sprays into the nostril(s) as directed by provider Daily. Use in each nostril as directed 30 mL 5   • cetirizine (zyrTEC) 10 MG tablet Take 1 tablet by mouth Daily. 90 tablet 0   • famotidine (PEPCID) 20 MG tablet      • fluticasone (FLONASE) 50 MCG/ACT nasal spray 2 sprays into the nostril(s) as directed by provider Daily. 16 g 5   • montelukast (SINGULAIR) 10 MG tablet      • norethindrone-ethinyl estradiol FE (Junel FE 1/20) 1-20 MG-MCG per tablet Take 1 tablet by mouth Daily. 84 tablet 3   • predniSONE (DELTASONE) 20 MG tablet      • Probiotic Product (PROBIOTIC DAILY PO) Take  by mouth.     • sertraline (ZOLOFT) 25 MG tablet Take 1 tablet by mouth Daily. 30 tablet 0   • SUMAtriptan (IMITREX) 100 MG tablet      • TiZANidine (ZANAFLEX) 2 MG capsule Take 1 capsule by mouth 3 (Three) Times a Day As Needed for Muscle Spasms. 60 capsule 1     No current facility-administered medications on file prior to visit.        When did you start taking these medications: 2 WEEKS AGO    Which medication are you concerned about: ZOLOFT    Who prescribed you this medication: CHASITY SMITH    What are your concerns: VERTIGO/ DIZZINESS EVEN WHEN LAYING DOWN    How long have you been taking these medications: ABOUT 2 WEEKS    How long have you had these concerns: STARTED FRIDAY

## 2021-08-10 NOTE — TELEPHONE ENCOUNTER
Let pt know she can cut the zoloft tab in half to see if helps.  If does not resolve then stop zoloft to see if sx go away. Will need to schedule FU appt to further discuss other med options.

## 2021-08-12 NOTE — TELEPHONE ENCOUNTER
Left voice mail that Cayla said she can cut the zoloft in half and if it does not resolve then stop the zoloft to see if the symptoms go away but will need a follow up appointment to discuss other medication options

## 2021-08-16 NOTE — TELEPHONE ENCOUNTER
Patient has been on day 3 with taking half a zoloft she would like to stop taking it all together. She would like to know when she can stop taking a half and stop completley     Please advise

## 2021-08-17 ENCOUNTER — TELEPHONE (OUTPATIENT)
Dept: INTERNAL MEDICINE | Facility: CLINIC | Age: 31
End: 2021-08-17

## 2021-08-17 ENCOUNTER — OFFICE VISIT (OUTPATIENT)
Dept: INTERNAL MEDICINE | Facility: CLINIC | Age: 31
End: 2021-08-17

## 2021-08-17 VITALS
HEART RATE: 111 BPM | SYSTOLIC BLOOD PRESSURE: 112 MMHG | OXYGEN SATURATION: 97 % | WEIGHT: 133.6 LBS | TEMPERATURE: 96.7 F | HEIGHT: 63 IN | BODY MASS INDEX: 23.67 KG/M2 | DIASTOLIC BLOOD PRESSURE: 78 MMHG

## 2021-08-17 DIAGNOSIS — F41.1 GENERALIZED ANXIETY DISORDER: Primary | ICD-10-CM

## 2021-08-17 PROCEDURE — 99213 OFFICE O/P EST LOW 20 MIN: CPT | Performed by: NURSE PRACTITIONER

## 2021-08-17 RX ORDER — PAROXETINE 10 MG/1
10 TABLET, FILM COATED ORAL EVERY MORNING
Qty: 30 TABLET | Refills: 0 | Status: SHIPPED | OUTPATIENT
Start: 2021-08-17 | End: 2021-09-16

## 2021-08-17 NOTE — TELEPHONE ENCOUNTER
Caller: Hyun Rosario    Relationship: Self    Best call back number: [phone]      Which medication are you concerned about: sertraline (ZOLOFT) 25 MG tablet    Who prescribed you this medication: ELIZABETH SMITH    What are your concerns: PATIENT WOULD LIKE TO KNOW HOW LONG SHE IS SUPPOSED TO TAKE 1/2 TABLET BEFORE SHE CAN QUIT THE MEDICATION.

## 2021-08-17 NOTE — PROGRESS NOTES
Chief Complaint   Patient presents with   • Medication Problem     vertigo since starting Zoloft       History of Present Illness    31 y.o.female presents for med problem for anxiety.  Seen back July 24 for anxiety   Chronic onset aprox 6 years. Has been seeing therapist about same time. Had never been on anx meds.  Recently feeling like anxiety getting worse. Therapist recommended consideration of meds, so she wanted to discuss med options. Not much depression; mostly anxiety.   Started on zoloft; within a few days started having dizziness vertigo; interfered with activities driving. Has since been weaning back down on zoloft and stopped. Would like to try different med.  Her mom takes zoloft and her dad takes paxil.    Review of Systems   Constitutional: Negative for chills and fever.   Cardiovascular: Negative for chest pain and palpitations.   Neurological: Positive for dizziness.   Psychiatric/Behavioral: The patient is nervous/anxious.          [facility]  The following portions of the patient's history were reviewed and updated as appropriate: allergies, current medications, past family history, past medical history, past social history, past surgical history and problem list.     Past Medical History:   Diagnosis Date   • Acid reflux    • Generalized anxiety disorder 7/27/2021   • H/O seasonal allergies     grass and dust   • Migraines       Allergies   Allergen Reactions   • Azithromycin Hives   • Amoxicillin Hives      Social History     Tobacco Use   • Smoking status: Never Smoker   • Smokeless tobacco: Never Used   Vaping Use   • Vaping Use: Never used   Substance Use Topics   • Alcohol use: Yes     Comment: one glass of wine per month   • Drug use: No     Past Surgical History:   Procedure Laterality Date   • WISDOM TOOTH EXTRACTION        Family History   Problem Relation Age of Onset   • No Known Problems Mother    • Hypertension Father    • Breast cancer Maternal Grandmother    • Ovarian cancer  "Paternal Grandmother            Current Outpatient Medications:   •  azelastine (ASTELIN) 0.1 % nasal spray, 2 sprays into the nostril(s) as directed by provider Daily. Use in each nostril as directed, Disp: 30 mL, Rfl: 5  •  cetirizine (zyrTEC) 10 MG tablet, Take 1 tablet by mouth Daily., Disp: 90 tablet, Rfl: 0  •  fluticasone (FLONASE) 50 MCG/ACT nasal spray, 2 sprays into the nostril(s) as directed by provider Daily., Disp: 16 g, Rfl: 5  •  montelukast (SINGULAIR) 10 MG tablet, , Disp: , Rfl:   •  norethindrone-ethinyl estradiol FE (Junel FE 1/20) 1-20 MG-MCG per tablet, Take 1 tablet by mouth Daily., Disp: 84 tablet, Rfl: 3  •  Probiotic Product (PROBIOTIC DAILY PO), Take  by mouth., Disp: , Rfl:   •  SUMAtriptan (IMITREX) 100 MG tablet, , Disp: , Rfl:   •  TiZANidine (ZANAFLEX) 2 MG capsule, Take 1 capsule by mouth 3 (Three) Times a Day As Needed for Muscle Spasms., Disp: 60 capsule, Rfl: 1  •  sertraline (ZOLOFT) 25 MG tablet, Take 1 tablet by mouth Daily., Disp: 30 tablet, Rfl: 0    VITALS:  /78   Pulse 111   Temp 96.7 °F (35.9 °C)   Ht 160 cm (63\")   Wt 60.6 kg (133 lb 9.6 oz)   SpO2 97%   BMI 23.67 kg/m²     Physical Exam  HENT:      Head: Normocephalic.   Cardiovascular:      Rate and Rhythm: Normal rate and regular rhythm.      Heart sounds: Normal heart sounds.   Pulmonary:      Effort: Pulmonary effort is normal. No respiratory distress.      Breath sounds: Normal breath sounds.   Neurological:      Mental Status: She is alert and oriented to person, place, and time.   Psychiatric:         Mood and Affect: Mood normal.           Assessment and Plan    Diagnoses and all orders for this visit:    1. Generalized anxiety disorder (Primary)  -     PARoxetine (Paxil) 10 MG tablet; Take 1 tablet by mouth Every Morning.  Dispense: 30 tablet; Refill: 0        I discussed the patients findings and my recommendations with patient.  Patient was encouraged to keep me informed of any acute changes, lack " of improvement, or any new concerning symptoms.  Patient voiced understanding of all instructions and denied further questions.      Follow Up   Return in about 4 weeks (around 9/14/2021), or if symptoms worsen or fail to improve, for Recheck.      Electronically signed by:    SHEMAR Pittman  08/17/2021

## 2021-09-15 DIAGNOSIS — F41.1 GENERALIZED ANXIETY DISORDER: ICD-10-CM

## 2021-09-16 RX ORDER — PAROXETINE 10 MG/1
TABLET, FILM COATED ORAL
Qty: 30 TABLET | Refills: 0 | Status: SHIPPED | OUTPATIENT
Start: 2021-09-16 | End: 2021-09-22 | Stop reason: SDUPTHER

## 2021-09-22 ENCOUNTER — OFFICE VISIT (OUTPATIENT)
Dept: INTERNAL MEDICINE | Facility: CLINIC | Age: 31
End: 2021-09-22

## 2021-09-22 VITALS
OXYGEN SATURATION: 98 % | SYSTOLIC BLOOD PRESSURE: 128 MMHG | DIASTOLIC BLOOD PRESSURE: 64 MMHG | TEMPERATURE: 97.4 F | HEART RATE: 110 BPM | WEIGHT: 140 LBS | BODY MASS INDEX: 24.8 KG/M2

## 2021-09-22 DIAGNOSIS — F41.1 GENERALIZED ANXIETY DISORDER: ICD-10-CM

## 2021-09-22 PROCEDURE — 99213 OFFICE O/P EST LOW 20 MIN: CPT | Performed by: NURSE PRACTITIONER

## 2021-09-22 RX ORDER — AZELASTINE HYDROCHLORIDE, FLUTICASONE PROPIONATE 137; 50 UG/1; UG/1
SPRAY, METERED NASAL
COMMUNITY
Start: 2021-08-27

## 2021-09-22 RX ORDER — RIZATRIPTAN BENZOATE 10 MG/1
TABLET ORAL
COMMUNITY
Start: 2021-08-20

## 2021-09-22 RX ORDER — PAROXETINE HYDROCHLORIDE 20 MG/1
20 TABLET, FILM COATED ORAL NIGHTLY
Qty: 30 TABLET | Refills: 1 | Status: SHIPPED | OUTPATIENT
Start: 2021-09-22 | End: 2021-11-22 | Stop reason: SDUPTHER

## 2021-09-22 NOTE — PROGRESS NOTES
Chief Complaint   Patient presents with   • Anxiety     follow up       History of Present Illness    31 y.o.female presents for anxiety 4 week follow up.  Chronic anxiety years.  Anxiety  Presents for follow-up visit. Symptoms include nervous/anxious behavior. Patient reports no chest pain, dizziness, shortness of breath or suicidal ideas. Symptoms occur occasionally. The severity of symptoms is mild. The quality of sleep is good.     Compliance with medications is %.   started on paxil last visit. Tried zoloft before that but made her dizzy so stopped.    Review of Systems   Respiratory: Negative for shortness of breath.    Cardiovascular: Negative for chest pain.   Neurological: Negative for dizziness and light-headedness.   Psychiatric/Behavioral: Negative for suicidal ideas. The patient is nervous/anxious.        [facility]  The following portions of the patient's history were reviewed and updated as appropriate: allergies, current medications, past family history, past medical history, past social history, past surgical history and problem list.     Past Medical History:   Diagnosis Date   • Acid reflux    • Generalized anxiety disorder 7/27/2021   • H/O seasonal allergies     grass and dust   • Migraines       Allergies   Allergen Reactions   • Azithromycin Hives   • Amoxicillin Hives      Social History     Tobacco Use   • Smoking status: Never Smoker   • Smokeless tobacco: Never Used   Vaping Use   • Vaping Use: Never used   Substance Use Topics   • Alcohol use: Yes     Comment: one glass of wine per month   • Drug use: No     Past Surgical History:   Procedure Laterality Date   • WISDOM TOOTH EXTRACTION        Family History   Problem Relation Age of Onset   • No Known Problems Mother    • Hypertension Father    • Breast cancer Maternal Grandmother    • Ovarian cancer Paternal Grandmother            Current Outpatient Medications:   •  cetirizine (zyrTEC) 10 MG tablet, Take 1 tablet by mouth Daily.,  Disp: 90 tablet, Rfl: 0  •  montelukast (SINGULAIR) 10 MG tablet, , Disp: , Rfl:   •  norethindrone-ethinyl estradiol FE (Junel FE 1/20) 1-20 MG-MCG per tablet, Take 1 tablet by mouth Daily., Disp: 84 tablet, Rfl: 3  •  PARoxetine (PAXIL) 10 MG tablet, TAKE ONE TABLET BY MOUTH EVERY MORNING, Disp: 30 tablet, Rfl: 0  •  Probiotic Product (PROBIOTIC DAILY PO), Take  by mouth., Disp: , Rfl:   •  TiZANidine (ZANAFLEX) 2 MG capsule, Take 1 capsule by mouth 3 (Three) Times a Day As Needed for Muscle Spasms., Disp: 60 capsule, Rfl: 1  •  Azelastine-Fluticasone 137-50 MCG/ACT suspension, , Disp: , Rfl:   •  rizatriptan (MAXALT) 10 MG tablet, , Disp: , Rfl:     VITALS:  /64   Pulse 110   Temp 97.4 °F (36.3 °C)   Wt 63.5 kg (140 lb)   SpO2 98%   BMI 24.80 kg/m²     Physical Exam  Vitals reviewed.   Eyes:      Pupils: Pupils are equal, round, and reactive to light.   Pulmonary:      Effort: Pulmonary effort is normal. No respiratory distress.   Neurological:      General: No focal deficit present.      Mental Status: She is alert and oriented to person, place, and time.   Psychiatric:         Mood and Affect: Mood normal.         Behavior: Behavior normal.         Assessment and Plan    Diagnoses and all orders for this visit:    1. Generalized anxiety disorder  -     PARoxetine (PAXIL) 20 MG tablet; Take 1 tablet by mouth Every Night.  Dispense: 30 tablet; Refill: 1    doing some better on paxil. Pt would like to try increased dose.     I discussed the patients findings and my recommendations with patient.  Patient was encouraged to keep me informed of any acute changes, lack of improvement, or any new concerning symptoms.  Patient voiced understanding of all instructions and denied further questions.      Follow Up   Return in about 8 weeks (around 11/17/2021), or if symptoms worsen or fail to improve.      Electronically signed by:    SHEMAR Pittman  09/22/2021

## 2021-11-22 ENCOUNTER — OFFICE VISIT (OUTPATIENT)
Dept: INTERNAL MEDICINE | Facility: CLINIC | Age: 31
End: 2021-11-22

## 2021-11-22 VITALS
HEART RATE: 88 BPM | OXYGEN SATURATION: 97 % | BODY MASS INDEX: 26.4 KG/M2 | WEIGHT: 149 LBS | SYSTOLIC BLOOD PRESSURE: 122 MMHG | HEIGHT: 63 IN | DIASTOLIC BLOOD PRESSURE: 82 MMHG

## 2021-11-22 DIAGNOSIS — F32.1 CURRENT MODERATE EPISODE OF MAJOR DEPRESSIVE DISORDER WITHOUT PRIOR EPISODE (HCC): ICD-10-CM

## 2021-11-22 DIAGNOSIS — F41.1 GENERALIZED ANXIETY DISORDER: Primary | ICD-10-CM

## 2021-11-22 PROCEDURE — 99214 OFFICE O/P EST MOD 30 MIN: CPT | Performed by: NURSE PRACTITIONER

## 2021-11-22 RX ORDER — PAROXETINE 30 MG/1
30 TABLET, FILM COATED ORAL NIGHTLY
Qty: 90 TABLET | Refills: 0 | Status: SHIPPED | OUTPATIENT
Start: 2021-11-22 | End: 2022-03-15 | Stop reason: SDUPTHER

## 2021-11-22 NOTE — PROGRESS NOTES
Chief Complaint   Patient presents with   • Anxiety     depression       History of Present Illness    31 y.o.female presents for anxiety depression.    Anxiety chronic onset months; better on prozac, but now having some depression. thinks anxiety was masking depression. No SI. Would like to increase dose.  Review of Systems   Constitutional: Negative for fatigue.   Respiratory: Negative for shortness of breath.    Cardiovascular: Negative for chest pain and palpitations.   Psychiatric/Behavioral: Positive for depressed mood. Negative for self-injury and suicidal ideas. The patient is nervous/anxious.          [facility]  The following portions of the patient's history were reviewed and updated as appropriate: allergies, current medications, past family history, past medical history, past social history, past surgical history and problem list.    Past Medical History:   Diagnosis Date   • Acid reflux    • Generalized anxiety disorder 7/27/2021   • H/O seasonal allergies     grass and dust   • Migraines       Allergies   Allergen Reactions   • Azithromycin Hives   • Amoxicillin Hives      Social History     Tobacco Use   • Smoking status: Never Smoker   • Smokeless tobacco: Never Used   Vaping Use   • Vaping Use: Never used   Substance Use Topics   • Alcohol use: Yes     Comment: one glass of wine per month   • Drug use: No     Past Surgical History:   Procedure Laterality Date   • WISDOM TOOTH EXTRACTION        Family History   Problem Relation Age of Onset   • No Known Problems Mother    • Hypertension Father    • Breast cancer Maternal Grandmother    • Ovarian cancer Paternal Grandmother            Current Outpatient Medications:   •  Azelastine-Fluticasone 137-50 MCG/ACT suspension, , Disp: , Rfl:   •  cetirizine (zyrTEC) 10 MG tablet, Take 1 tablet by mouth Daily., Disp: 90 tablet, Rfl: 0  •  montelukast (SINGULAIR) 10 MG tablet, , Disp: , Rfl:   •  norethindrone-ethinyl estradiol FE (Junel FE 1/20) 1-20 MG-MCG per  "tablet, Take 1 tablet by mouth Daily., Disp: 84 tablet, Rfl: 3  •  PARoxetine (PAXIL) 20 MG tablet, Take 1 tablet by mouth Every Night., Disp: 30 tablet, Rfl: 1  •  Probiotic Product (PROBIOTIC DAILY PO), Take  by mouth., Disp: , Rfl:   •  rizatriptan (MAXALT) 10 MG tablet, , Disp: , Rfl:   •  TiZANidine (ZANAFLEX) 2 MG capsule, Take 1 capsule by mouth 3 (Three) Times a Day As Needed for Muscle Spasms., Disp: 60 capsule, Rfl: 1    VITALS:  Pulse 88   Ht 160 cm (63\")   Wt 67.6 kg (149 lb)   SpO2 97%   Breastfeeding No   BMI 26.39 kg/m²     Physical Exam  Vitals reviewed.   Constitutional:       General: She is not in acute distress.  Eyes:      Pupils: Pupils are equal, round, and reactive to light.   Cardiovascular:      Rate and Rhythm: Normal rate and regular rhythm.      Heart sounds: Normal heart sounds.   Pulmonary:      Effort: Pulmonary effort is normal.      Breath sounds: Normal breath sounds.   Neurological:      General: No focal deficit present.      Mental Status: She is alert and oriented to person, place, and time.   Psychiatric:         Mood and Affect: Mood normal.         Behavior: Behavior normal.         Result Review :            Assessment and Plan    Diagnoses and all orders for this visit:    1. Generalized anxiety disorder (Primary)  -     PARoxetine (PAXIL) 30 MG tablet; Take 1 tablet by mouth Every Night.  Dispense: 90 tablet; Refill: 0    2. Current moderate episode of major depressive disorder without prior episode (HCC)  -     PARoxetine (PAXIL) 30 MG tablet; Take 1 tablet by mouth Every Night.  Dispense: 90 tablet; Refill: 0      Discussed treatment options side effects risks benefits. Pt would like to increase prozac dose. Discussed if any worsening symptoms need to contact me for follow up or seek emergency care.    I discussed the patients findings and my recommendations with patient.  Patient was encouraged to keep me informed of any acute changes, lack of improvement, or any " new concerning symptoms.  Patient voiced understanding of all instructions and denied further questions.      Follow Up   Return in about 4 weeks (around 12/20/2021), or if symptoms worsen or fail to improve.      Electronically signed by:    SHEMAR Pittman  11/22/2021

## 2021-12-03 DIAGNOSIS — F41.1 GENERALIZED ANXIETY DISORDER: ICD-10-CM

## 2021-12-03 RX ORDER — PAROXETINE HYDROCHLORIDE 20 MG/1
TABLET, FILM COATED ORAL
Qty: 30 TABLET | Refills: 0 | Status: SHIPPED | OUTPATIENT
Start: 2021-12-03 | End: 2022-03-13

## 2021-12-03 NOTE — TELEPHONE ENCOUNTER
Pt states needs the 20 mg until her winter break, due to side effects she is waiting to start the 30 mg

## 2021-12-06 ENCOUNTER — TELEMEDICINE (OUTPATIENT)
Dept: INTERNAL MEDICINE | Facility: CLINIC | Age: 31
End: 2021-12-06

## 2021-12-06 DIAGNOSIS — R09.81 NASAL CONGESTION: ICD-10-CM

## 2021-12-06 DIAGNOSIS — R05.9 COUGH: Primary | ICD-10-CM

## 2021-12-06 DIAGNOSIS — J02.9 SORE THROAT: ICD-10-CM

## 2021-12-06 LAB
EXPIRATION DATE: ABNORMAL
FLUAV AG NPH QL: NEGATIVE
FLUBV AG NPH QL: NEGATIVE
INTERNAL CONTROL: ABNORMAL
Lab: 2780

## 2021-12-06 PROCEDURE — 99213 OFFICE O/P EST LOW 20 MIN: CPT | Performed by: INTERNAL MEDICINE

## 2021-12-06 PROCEDURE — U0004 COV-19 TEST NON-CDC HGH THRU: HCPCS | Performed by: INTERNAL MEDICINE

## 2021-12-06 PROCEDURE — 87804 INFLUENZA ASSAY W/OPTIC: CPT | Performed by: INTERNAL MEDICINE

## 2021-12-06 NOTE — PROGRESS NOTES
Internal Medicine Acute Visit    Chief Complaint   Patient presents with   • Headache     Since friday and gotten worse since, cough, congestion N/V        HPI  Ms. Rosario verbally consented to a telehealth visit. She was seen via telehealth using real-time video conferencing technology. This visit was requested because of the COVID-19 pandemic. She was located at her home, and provider was located at [facility] at [address]. The telehealth visit started 9:50am and ended at 9:59am EST. She notes symptoms starting on Friday with sore throat, cough, congestion, headache. Saturday she had N/V x1, chills. She has been checking her temperature and has not had a fever. Symptoms are worsening overall. She takes dayquil with improvement. COVID test was negative Saturday. No further N/V/D. Her boyfriend also now has congestion.         Review of Systems  Review of Systems   Constitutional: Positive for chills and fatigue. Negative for fever.   HENT: Positive for congestion, postnasal drip, rhinorrhea and sore throat (improving).    Respiratory: Positive for cough and chest tightness (heaviness). Negative for shortness of breath.    Gastrointestinal: Positive for nausea and vomiting.        Medications  Current Outpatient Medications on File Prior to Visit   Medication Sig Dispense Refill   • Azelastine-Fluticasone 137-50 MCG/ACT suspension      • cetirizine (zyrTEC) 10 MG tablet Take 1 tablet by mouth Daily. 90 tablet 0   • montelukast (SINGULAIR) 10 MG tablet      • norethindrone-ethinyl estradiol FE (Junel FE 1/20) 1-20 MG-MCG per tablet Take 1 tablet by mouth Daily. 84 tablet 3   • PARoxetine (PAXIL) 20 MG tablet TAKE ONE TABLET BY MOUTH ONCE NIGHTLY 30 tablet 0   • PARoxetine (PAXIL) 30 MG tablet Take 1 tablet by mouth Every Night. 90 tablet 0   • Probiotic Product (PROBIOTIC DAILY PO) Take  by mouth.     • rizatriptan (MAXALT) 10 MG tablet      • TiZANidine (ZANAFLEX) 2 MG capsule Take 1  capsule by mouth 3 (Three) Times a Day As Needed for Muscle Spasms. 60 capsule 1     No current facility-administered medications on file prior to visit.        Allergies  Allergies   Allergen Reactions   • Azithromycin Hives   • Amoxicillin Hives       PMH  Past Medical History:   Diagnosis Date   • Acid reflux    • Generalized anxiety disorder 7/27/2021   • H/O seasonal allergies     grass and dust   • Migraines        Objective  There were no vitals taken for this visit.     Physical Exam  Physical Exam  Vitals and nursing note reviewed.   Constitutional:       General: She is not in acute distress.     Appearance: She is well-developed. She is ill-appearing. She is not toxic-appearing.   HENT:      Head: Normocephalic and atraumatic.   Eyes:      Conjunctiva/sclera: Conjunctivae normal.   Pulmonary:      Effort: Pulmonary effort is normal. No respiratory distress.   Skin:     Comments: No perioral cyanosis   Neurological:      Mental Status: She is alert and oriented to person, place, and time.         Results  No results associated with this encounter.     Assessment and Plan  Diagnoses and all orders for this visit:    Cough, Nasal congestion, Sore throat  - COVID negative on Saturday however this may be false negative. Will repeat along with flu swab.  - Sore throat improving, denies tonsillar exudate or enlargement and centor score 0. Do not suspect strep.  - Recommend hydration, supportive care, rest.  - Go to ED for increasing SOA, chest pain    Return if symptoms worsen or fail to improve.     This patient has consented to a telehealth visit via video. The visit was scheduled to comply with patient safety concerns in accordance with CDC recommendations.  I spent 15 minutes in total including but not limited to the 9 minutes spent in direct conversation with this patient.

## 2021-12-07 LAB — SARS-COV-2 RNA NOSE QL NAA+PROBE: NOT DETECTED

## 2021-12-10 ENCOUNTER — TELEPHONE (OUTPATIENT)
Dept: INTERNAL MEDICINE | Facility: CLINIC | Age: 31
End: 2021-12-10

## 2021-12-10 NOTE — TELEPHONE ENCOUNTER
PATIENT HAD VIRTUAL APPOINTMENT AND MISSED WORK Monday THROUGH Thursday.  WOULD LIKE WORK NOTE.  SHE WILL  WHEN READY.     PLEASE CALL [phone]

## 2021-12-11 ENCOUNTER — TELEPHONE (OUTPATIENT)
Dept: INTERNAL MEDICINE | Facility: CLINIC | Age: 31
End: 2021-12-11

## 2021-12-11 NOTE — TELEPHONE ENCOUNTER
Message indicates cough and chest congestion with neg flu and COVID19 tests    No answer. Left msg to go to [facility] for further eval as her last visit was via telemedicine

## 2021-12-12 ENCOUNTER — TELEMEDICINE (OUTPATIENT)
Dept: FAMILY MEDICINE CLINIC | Facility: TELEHEALTH | Age: 31
End: 2021-12-12

## 2021-12-12 VITALS — HEIGHT: 63 IN | WEIGHT: 149 LBS | BODY MASS INDEX: 26.4 KG/M2

## 2021-12-12 DIAGNOSIS — J40 BRONCHITIS: Primary | ICD-10-CM

## 2021-12-12 PROCEDURE — 99213 OFFICE O/P EST LOW 20 MIN: CPT | Performed by: NURSE PRACTITIONER

## 2021-12-12 RX ORDER — DOXYCYCLINE 100 MG/1
100 CAPSULE ORAL 2 TIMES DAILY
Qty: 10 CAPSULE | Refills: 0 | Status: SHIPPED | OUTPATIENT
Start: 2021-12-12 | End: 2021-12-17

## 2021-12-12 RX ORDER — DEXTROMETHORPHAN HYDROBROMIDE AND PROMETHAZINE HYDROCHLORIDE 15; 6.25 MG/5ML; MG/5ML
5 SYRUP ORAL NIGHTLY PRN
Qty: 118 ML | Refills: 0 | Status: SHIPPED | OUTPATIENT
Start: 2021-12-12 | End: 2022-03-15

## 2021-12-12 RX ORDER — PREDNISONE 20 MG/1
TABLET ORAL
Qty: 13 TABLET | Refills: 0 | Status: SHIPPED | OUTPATIENT
Start: 2021-12-12 | End: 2022-03-15

## 2021-12-12 NOTE — PROGRESS NOTES
You have chosen to receive care through a telehealth visit.  Do you consent to use a video/audio connection for your medical care today? Yes     CHIEF COMPLAINT  Cc: cough    HPI  Hyun Rosario is a 31 y.o. female  presents with complaint of a cough. It started with a sore throat but that has resolved. Symptoms include; cough, chest tightness, shortness of breath, clear nasal congestion, maxillary sinus pain and pressure, diarrhea, mild body aches and headache. She has had her symptoms for over one week. She was seen in her primary care providers office on 12/06/2021 and was diagnosed with cough, nasal congestion and a sore throat. She was both COVID-19 and flu tested and all tests were negative. She thought she was getting better and now she is worsening again. The cough has gotten worse. She is vaccinated for COVID-19 via the Moderna vaccine.     Review of Systems   Constitutional: Positive for fatigue. Negative for fever.   HENT: Positive for congestion (clear), postnasal drip, sinus pressure (maxillary) and sinus pain (maxillary). Sore throat: resolved.    Respiratory: Positive for cough, chest tightness (mild) and shortness of breath. Negative for wheezing.    Cardiovascular: Negative for chest pain.   Gastrointestinal: Positive for diarrhea. Negative for nausea and vomiting.   Musculoskeletal: Positive for myalgias (mild).   Neurological: Positive for headaches (mild).       Past Medical History:   Diagnosis Date   • Acid reflux    • Generalized anxiety disorder 7/27/2021   • H/O seasonal allergies     grass and dust   • Migraines        Family History   Problem Relation Age of Onset   • No Known Problems Mother    • Hypertension Father    • Breast cancer Maternal Grandmother    • Ovarian cancer Paternal Grandmother        Social History     Socioeconomic History   • Marital status: Single   Tobacco Use   • Smoking status: Never Smoker   • Smokeless tobacco: Never Used   Vaping Use   • Vaping Use: Never used  "  Substance and Sexual Activity   • Alcohol use: Yes     Comment: one glass of wine per month   • Drug use: No   • Sexual activity: Yes     Partners: Male     Birth control/protection: OCP         Ht 160 cm (63\")   Wt 67.6 kg (149 lb)   Breastfeeding No   BMI 26.39 kg/m²     PHYSICAL EXAM  Physical Exam   Constitutional: She is oriented to person, place, and time. She appears well-developed and well-nourished.   HENT:   Head: Normocephalic and atraumatic.   Right Ear: External ear normal.   Left Ear: External ear normal.   Nose: Congestion present. Right sinus exhibits maxillary sinus tenderness (patient directed exam). Left sinus exhibits maxillary sinus tenderness (patient directed exam).   Mouth/Throat: Mouth/Lips are normal.  Eyes: Lids are normal. Right eye exhibits no discharge and no exudate. Left eye exhibits no discharge and no exudate. Right conjunctiva is not injected. Left conjunctiva is not injected.   Pulmonary/Chest: No accessory muscle usage. No tachypnea and no bradypnea.  No respiratory distress (frequent cough at visit).No use of oxygen by nasal cannulaNo use of oxygen by mask noted.  Abdominal: Abdomen appears normal.   Neurological: She is alert and oriented to person, place, and time. No cranial nerve deficit.   Skin: Her skin appears normal.  Psychiatric: She has a normal mood and affect. Her speech is normal and behavior is normal. Judgment and thought content normal.       Results for orders placed or performed in visit on 12/06/21   COVID-19 PCR, [facility], NP SWAB IN [facility] VIRAL TRANSPORT MEDIA/ORAL SWISH 24-30 HR TAT - Swab, Nasopharynx    Specimen: Nasopharynx; Swab   Result Value Ref Range    SARS-CoV-2 EDUARDO Not Detected Not Detected   POCT Influenza A/B    Specimen: Swab   Result Value Ref Range    Rapid Influenza A Ag Negative Negative    Rapid Influenza B Ag Negative Negative    Internal Control Passed Passed    Lot Number [id number]     Expiration Date 5,072,022        Diagnoses and " all orders for this visit:    1. Bronchitis (Primary)    Other orders  -     doxycycline (MONODOX) 100 MG capsule; Take 1 capsule by mouth 2 (Two) Times a Day for 5 days.  Dispense: 10 capsule; Refill: 0  -     predniSONE (DELTASONE) 20 MG tablet; Prednisone 20mg tabs, 3 for 2 days, 2 for 2 days, 1 for 2 days, 1/2 for 2 days take with food or milk  Dispense: 13 tablet; Refill: 0  -     promethazine-dextromethorphan (PROMETHAZINE-DM) 6.25-15 MG/5ML syrup; Take 5 mL by mouth At Night As Needed for Cough.  Dispense: 118 mL; Refill: 0    Continue Mucinex with plenty of fluids especially water to thin secretions and help with congestion.  Take prednisone with food as early in the day as possible  Do not take prednisone with nsaids such as ibuprofen, aleve, or aspirin  May take tylenol for pain or fever  Probiotics for two weeks related to taking antibiotics. The pharmacist can help you with this if needed. Do not take within two hours of antibiotic.  Do not drive after taking promethazine DM as it may make you drowsy    FOLLOW-UP  If symptoms worsen or persist follow up with PCP/[facility] or Urgent Care    Patient verbalizes understanding of medication dosage, comfort measures, instructions for treatment and follow-up.    SHEMAR Oleary  12/12/2021  08:12 EST    This visit was performed via Telehealth.  This patient has been instructed to follow-up with their primary care provider if their symptoms worsen or the treatment provided does not resolve their illness.

## 2021-12-12 NOTE — PATIENT INSTRUCTIONS
Acute Bronchitis, Adult    Acute bronchitis is sudden or acute swelling of the air tubes (bronchi) in the lungs. Acute bronchitis causes these tubes to fill with mucus, which can make it hard to breathe. It can also cause coughing or wheezing.  In adults, acute bronchitis usually goes away within 2 weeks. A cough caused by bronchitis may last up to 3 weeks. Smoking, allergies, and asthma can make the condition worse.  What are the causes?  This condition can be caused by germs and by substances that irritate the lungs, including:  · Cold and flu viruses. The most common cause of this condition is the virus that causes the common cold.  · Bacteria.  · Substances that irritate the lungs, including:  ? Smoke from cigarettes and other forms of tobacco.  ? Dust and pollen.  ? Fumes from chemical products, gases, or burned fuel.  ? Other materials that pollute indoor or outdoor air.  · Close contact with someone who has acute bronchitis.  What increases the risk?  The following factors may make you more likely to develop this condition:  · A weak body's defense system, also called the immune system.  · A condition that affects your lungs and breathing, such as asthma.  What are the signs or symptoms?  Common symptoms of this condition include:  · Lung and breathing problems, such as:  ? Coughing. This may bring up clear, yellow, or green mucus from your lungs (sputum).  ? Wheezing.  ? Having too much mucus in your lungs (chest congestion).  ? Having shortness of breath.  · A fever.  · Chills.  · Aches and pains, including:  ? Tightness in your chest and other body aches.  ? A sore throat.  How is this diagnosed?  This condition is usually diagnosed based on:  · Your symptoms and medical history.  · A physical exam.  You may also have other tests, including tests to rule out other conditions, such pneumonia. These tests include:  · A test of lung function.  · Test of a mucus sample to look for the presence of  bacteria.  · Tests to check the oxygen level in your blood.  · Blood tests.  · Chest X-ray.  How is this treated?  Most cases of acute bronchitis clear up over time without treatment. Your health care provider may recommend:  · Drinking more fluids. This can thin your mucus, which may improve your breathing.  · Taking a medicine for a fever or cough.  · Using a device that gets medicine into your lungs (inhaler) to help improve breathing and control coughing.  · Using a vaporizer or a humidifier. These are machines that add water to the air to help you breathe better.  Follow these instructions at home:  Activity  · Get plenty of rest.  · Return to your normal activities as told by your health care provider. Ask your health care provider what activities are safe for you.  Lifestyle  · Drink enough fluid to keep your urine pale yellow.  · Do not drink alcohol.  · Do not use any products that contain nicotine or tobacco, such as cigarettes, e-cigarettes, and chewing tobacco. If you need help quitting, ask your health care provider. Be aware that:  ? Your bronchitis will get worse if you smoke or breathe in other people's smoke (secondhand smoke).  ? Your lungs will heal faster if you quit smoking.  General instructions    · Take over-the-counter and prescription medicines only as told by your health care provider.  · Use an inhaler, vaporizer, or humidifier as told by your health care provider.  · If you have a sore throat, gargle with a salt-water mixture 3-4 times a day or as needed. To make a salt-water mixture, completely dissolve ½-1 tsp (3-6 g) of salt in 1 cup (237 mL) of warm water.  · Keep all follow-up visits as told by your health care provider. This is important.    How is this prevented?  To lower your risk of getting this condition again:  · Wash your hands often with soap and water. If soap and water are not available, use hand .  · Avoid contact with people who have cold symptoms.  · Try not  to touch your mouth, nose, or eyes with your hands.  · Avoid places where there are fumes from chemicals. Breathing these fumes will make your condition worse.  · Get the flu shot every year.  Contact a health care provider if:  · Your symptoms do not improve after 2 weeks of treatment.  · You vomit more than once or twice.  · You have symptoms of dehydration such as:  ? Dark urine.  ? Dry skin or eyes.  ? Increased thirst.  ? Headaches.  ? Confusion.  ? Muscle cramps.  Get help right away if you:  · Cough up blood.  · Feel pain in your chest.  · Have severe shortness of breath.  · Faint or keep feeling like you are going to faint.  · Have a severe headache.  · Have fever or chills that get worse.  These symptoms may represent a serious problem that is an emergency. Do not wait to see if the symptoms will go away. Get medical help right away. Call your local emergency services (911 in the [place] Do not drive yourself to the hospital.  Summary  · Acute bronchitis is sudden (acute) inflammation of the air tubes (bronchi) between the windpipe and the lungs. In adults, acute bronchitis usually goes away within 2 weeks, although coughing may last 3 weeks or longer  · Take over-the-counter and prescription medicines only as told by your health care provider.  · Drink enough fluid to keep your urine pale yellow.  · Contact a health care provider if your symptoms do not improve after 2 weeks of treatment.  · Get help right away if you cough up blood, faint, or have chest pain or shortness of breath.  This information is not intended to replace advice given to you by your health care provider. Make sure you discuss any questions you have with your health care provider.  Document Revised: 08/31/2020 Document Reviewed: 07/10/2020  [employer] Patient Education © 2021 [employer]

## 2022-03-07 ENCOUNTER — TELEPHONE (OUTPATIENT)
Dept: INTERNAL MEDICINE | Facility: CLINIC | Age: 32
End: 2022-03-07

## 2022-03-07 NOTE — TELEPHONE ENCOUNTER
"\"HUB TO READ\"     LVM LETTING PT KNOW LUIS IS LEAVING. SHE CAN ESTABLISH WITH [facility] OR [facility].    HER FOLLOW UP WILL NEED TO BE RESCHEDULED WITH ONE OF THEM. 30 MIN SLOT  "

## 2022-03-13 DIAGNOSIS — F41.1 GENERALIZED ANXIETY DISORDER: ICD-10-CM

## 2022-03-13 RX ORDER — PAROXETINE HYDROCHLORIDE 20 MG/1
TABLET, FILM COATED ORAL
Qty: 30 TABLET | Refills: 0 | Status: SHIPPED | OUTPATIENT
Start: 2022-03-13 | End: 2022-03-15

## 2022-03-15 ENCOUNTER — OFFICE VISIT (OUTPATIENT)
Dept: INTERNAL MEDICINE | Facility: CLINIC | Age: 32
End: 2022-03-15

## 2022-03-15 VITALS
DIASTOLIC BLOOD PRESSURE: 74 MMHG | BODY MASS INDEX: 29.05 KG/M2 | WEIGHT: 164 LBS | HEART RATE: 109 BPM | SYSTOLIC BLOOD PRESSURE: 128 MMHG | OXYGEN SATURATION: 97 % | TEMPERATURE: 98.1 F

## 2022-03-15 DIAGNOSIS — F41.1 GENERALIZED ANXIETY DISORDER: ICD-10-CM

## 2022-03-15 DIAGNOSIS — F32.1 CURRENT MODERATE EPISODE OF MAJOR DEPRESSIVE DISORDER WITHOUT PRIOR EPISODE: ICD-10-CM

## 2022-03-15 PROCEDURE — 99213 OFFICE O/P EST LOW 20 MIN: CPT | Performed by: NURSE PRACTITIONER

## 2022-03-15 RX ORDER — PAROXETINE HYDROCHLORIDE 40 MG/1
40 TABLET, FILM COATED ORAL NIGHTLY
Qty: 90 TABLET | Refills: 0 | Status: SHIPPED | OUTPATIENT
Start: 2022-03-15 | End: 2022-06-08

## 2022-03-15 NOTE — PROGRESS NOTES
Chief Complaint   Patient presents with   • Med Management   for anxiety    History of Present Illness    32 y.o.female presents for anxiety  Continue to have anxiety taking paxil 30mg. See's therapist who recommends considering increasing dose.  Review of Systems   Constitutional: Negative for chills and fever.   Respiratory: Negative for shortness of breath.    Psychiatric/Behavioral: Negative for self-injury, suicidal ideas and depressed mood. The patient is nervous/anxious.        [facility]  The following portions of the patient's history were reviewed and updated as appropriate: allergies, current medications, past family history, past medical history, past social history, past surgical history and problem list.     Past Medical History:   Diagnosis Date   • Acid reflux    • Generalized anxiety disorder 7/27/2021   • H/O seasonal allergies     grass and dust   • Migraines       Allergies   Allergen Reactions   • Azithromycin Hives   • Amoxicillin Hives      Social History     Tobacco Use   • Smoking status: Never Smoker   • Smokeless tobacco: Never Used   Vaping Use   • Vaping Use: Never used   Substance Use Topics   • Alcohol use: Yes     Comment: one glass of wine per month   • Drug use: No     Past Surgical History:   Procedure Laterality Date   • WISDOM TOOTH EXTRACTION        Family History   Problem Relation Age of Onset   • No Known Problems Mother    • Hypertension Father    • Breast cancer Maternal Grandmother    • Ovarian cancer Paternal Grandmother            Current Outpatient Medications:   •  Azelastine-Fluticasone 137-50 MCG/ACT suspension, , Disp: , Rfl:   •  cetirizine (zyrTEC) 10 MG tablet, Take 1 tablet by mouth Daily., Disp: 90 tablet, Rfl: 0  •  montelukast (SINGULAIR) 10 MG tablet, , Disp: , Rfl:   •  norethindrone-ethinyl estradiol FE (Junel FE 1/20) 1-20 MG-MCG per tablet, Take 1 tablet by mouth Daily., Disp: 84 tablet, Rfl: 3  •  PARoxetine (PAXIL) 20 MG tablet, TAKE ONE TABLET BY MOUTH  ONCE NIGHTLY, Disp: 30 tablet, Rfl: 0  •  PARoxetine (PAXIL) 30 MG tablet, Take 1 tablet by mouth Every Night., Disp: 90 tablet, Rfl: 0  •  Probiotic Product (PROBIOTIC DAILY PO), Take  by mouth., Disp: , Rfl:   •  rizatriptan (MAXALT) 10 MG tablet, , Disp: , Rfl:   •  TiZANidine (ZANAFLEX) 2 MG capsule, Take 1 capsule by mouth 3 (Three) Times a Day As Needed for Muscle Spasms., Disp: 60 capsule, Rfl: 1    VITALS:  /74   Pulse 109   Temp 98.1 °F (36.7 °C)   Wt 74.4 kg (164 lb)   SpO2 97%   BMI 29.05 kg/m²     Physical Exam  Vitals reviewed.   Pulmonary:      Effort: Pulmonary effort is normal. No respiratory distress.   Neurological:      Mental Status: She is alert and oriented to person, place, and time.   Psychiatric:         Mood and Affect: Mood normal.         Result Review :            Assessment and Plan    Diagnoses and all orders for this visit:    1. Generalized anxiety disorder  -     PARoxetine (PAXIL) 40 MG tablet; Take 1 tablet by mouth Every Night.  Dispense: 90 tablet; Refill: 0    2. Current moderate episode of major depressive disorder without prior episode (HCC)  -     PARoxetine (PAXIL) 40 MG tablet; Take 1 tablet by mouth Every Night.  Dispense: 90 tablet; Refill: 0    increased paxil.     I discussed the patients findings and my recommendations with patient.  Patient was encouraged to keep me informed of any acute changes, lack of improvement, or any new concerning symptoms.  Patient voiced understanding of all instructions and denied further questions.      Follow Up   Return in about 1 month (around 4/16/2022), or if symptoms worsen or fail to improve, for Recheck, please schedule with me. I will be her on april 16th..      Electronically signed by:    SHEMAR Pittman  03/15/2022

## 2022-05-04 NOTE — TELEPHONE ENCOUNTER
Caller: Hyun Rosario    Relationship: Self    Best call back number: [phone]    Requested Prescriptions:   Requested Prescriptions     Pending Prescriptions Disp Refills   • PARoxetine (PAXIL) 20 MG tablet [Pharmacy Med Name: PARoxetine HCL 20 MG TABLET] 30 tablet 1     Sig: TAKE ONE TABLET BY MOUTH ONCE NIGHTLY        Pharmacy where request should be sent: [facility] [address] - [address] [facility] 150 AT [facility] - [phone]  - [phone] FX     Additional details provided by patient: WILL RUN OUT OVER THE WEEKEND    Does the patient have less than a 3 day supply:  [x] Yes  [] No    Edenilson Chau Rep   12/03/21 15:47 EST            No

## 2022-06-05 DIAGNOSIS — F41.1 GENERALIZED ANXIETY DISORDER: ICD-10-CM

## 2022-06-05 DIAGNOSIS — F32.1 CURRENT MODERATE EPISODE OF MAJOR DEPRESSIVE DISORDER WITHOUT PRIOR EPISODE: ICD-10-CM

## 2022-06-06 DIAGNOSIS — Z30.41 ENCOUNTER FOR SURVEILLANCE OF CONTRACEPTIVE PILLS: ICD-10-CM

## 2022-06-06 RX ORDER — PAROXETINE HYDROCHLORIDE 40 MG/1
TABLET, FILM COATED ORAL
Qty: 90 TABLET | Refills: 0 | OUTPATIENT
Start: 2022-06-06

## 2022-06-06 RX ORDER — NORETHINDRONE ACETATE AND ETHINYL ESTRADIOL 1MG-20(21)
KIT ORAL
Qty: 84 TABLET | Refills: 3 | OUTPATIENT
Start: 2022-06-06

## 2022-06-08 DIAGNOSIS — Z30.41 ENCOUNTER FOR SURVEILLANCE OF CONTRACEPTIVE PILLS: ICD-10-CM

## 2022-06-08 DIAGNOSIS — F32.1 CURRENT MODERATE EPISODE OF MAJOR DEPRESSIVE DISORDER WITHOUT PRIOR EPISODE: ICD-10-CM

## 2022-06-08 DIAGNOSIS — F41.1 GENERALIZED ANXIETY DISORDER: ICD-10-CM

## 2022-06-08 RX ORDER — PAROXETINE HYDROCHLORIDE 40 MG/1
TABLET, FILM COATED ORAL
Qty: 30 TABLET | Refills: 0 | Status: SHIPPED | OUTPATIENT
Start: 2022-06-08 | End: 2022-07-19 | Stop reason: SDUPTHER

## 2022-06-08 RX ORDER — NORETHINDRONE ACETATE AND ETHINYL ESTRADIOL 1MG-20(21)
KIT ORAL
Qty: 84 TABLET | Refills: 3 | OUTPATIENT
Start: 2022-06-08

## 2022-06-14 ENCOUNTER — TELEPHONE (OUTPATIENT)
Dept: OBSTETRICS AND GYNECOLOGY | Facility: CLINIC | Age: 32
End: 2022-06-14

## 2022-06-14 DIAGNOSIS — Z30.41 ENCOUNTER FOR SURVEILLANCE OF CONTRACEPTIVE PILLS: ICD-10-CM

## 2022-06-14 RX ORDER — NORETHINDRONE ACETATE AND ETHINYL ESTRADIOL 1MG-20(21)
1 KIT ORAL DAILY
Qty: 84 TABLET | Refills: 0 | Status: SHIPPED | OUTPATIENT
Start: 2022-06-14 | End: 2022-09-06 | Stop reason: SDUPTHER

## 2022-06-14 NOTE — TELEPHONE ENCOUNTER
Patients annual was rs due to JNA out of office, she is needing birthcontrol sent in to pharmacy [address], she is needing today

## 2022-07-19 ENCOUNTER — TELEPHONE (OUTPATIENT)
Dept: INTERNAL MEDICINE | Facility: CLINIC | Age: 32
End: 2022-07-19

## 2022-07-19 DIAGNOSIS — F32.1 CURRENT MODERATE EPISODE OF MAJOR DEPRESSIVE DISORDER WITHOUT PRIOR EPISODE: ICD-10-CM

## 2022-07-19 DIAGNOSIS — F41.1 GENERALIZED ANXIETY DISORDER: ICD-10-CM

## 2022-07-19 NOTE — TELEPHONE ENCOUNTER
Caller: RosarioHyun    Relationship: Self    Best call back number: [phone]    Requested Prescriptions:   Requested Prescriptions     Pending Prescriptions Disp Refills   • PARoxetine (PAXIL) 40 MG tablet 30 tablet 0     Sig: Take 1 tablet by mouth Every Night.        Pharmacy where request should be sent: [facility] - [address] [facility] 150 AT [facility] - [phone]  - [phone] FX     Additional details provided by patient:    PATIENT USED TO SEE DOCTOR LUIS.  PATIENT IS ABOUT TO LOSE HER HEALTH INSURANCE AND WOULD LIKE TO KNOW IF MORE THAN A 30 DAY SUPPLY CAN BE CALLED IN FOR HER PAXIL    Does the patient have less than a 3 day supply:  [x] Yes  [] No    Edenilson Burton Rep   07/19/22 10:58 EDT

## 2022-07-19 NOTE — TELEPHONE ENCOUNTER
Last office visit          3/15/22  Next office visit          8/1/22    Lab completed in past 6 months? No  Labs completed in past year? Yes    Last refill Date:            6/8/22  Quantity:                      30    Pharmacy:     [facility] [address] [facility] 150 AT [facility] [phone] [facility] [phone] FX       Please review pended refill request for any changes needed on refills or quantities.  Thank you!

## 2022-07-20 ENCOUNTER — TELEPHONE (OUTPATIENT)
Dept: INTERNAL MEDICINE | Facility: CLINIC | Age: 32
End: 2022-07-20

## 2022-07-20 RX ORDER — PAROXETINE HYDROCHLORIDE 40 MG/1
40 TABLET, FILM COATED ORAL NIGHTLY
Qty: 90 TABLET | Refills: 0 | Status: SHIPPED | OUTPATIENT
Start: 2022-07-20 | End: 2022-10-19

## 2022-07-20 NOTE — TELEPHONE ENCOUNTER
PT CALLED TO CHECK ON STATUS OF RX  PAXIL, PT REQUEST A LARGER QUANTITY BECAUSE HER INSURANCE IT ABOUT TO END ALSO.    PLEASE ADVISE.  CALL BACK:[phone]

## 2022-09-02 DIAGNOSIS — Z30.41 ENCOUNTER FOR SURVEILLANCE OF CONTRACEPTIVE PILLS: ICD-10-CM

## 2022-09-02 RX ORDER — NORETHINDRONE ACETATE AND ETHINYL ESTRADIOL 1MG-20(21)
KIT ORAL
Qty: 84 TABLET | Refills: 0 | OUTPATIENT
Start: 2022-09-02

## 2022-09-06 ENCOUNTER — TELEPHONE (OUTPATIENT)
Dept: OBSTETRICS AND GYNECOLOGY | Facility: CLINIC | Age: 32
End: 2022-09-06

## 2022-09-06 DIAGNOSIS — Z30.41 ENCOUNTER FOR SURVEILLANCE OF CONTRACEPTIVE PILLS: ICD-10-CM

## 2022-09-06 RX ORDER — NORETHINDRONE ACETATE AND ETHINYL ESTRADIOL 1MG-20(21)
1 KIT ORAL DAILY
Qty: 28 TABLET | Refills: 0 | Status: SHIPPED | OUTPATIENT
Start: 2022-09-06 | End: 2022-09-12 | Stop reason: SINTOL

## 2022-09-06 NOTE — TELEPHONE ENCOUNTER
Dr. Thomas pt.     Last annual : 6/23/2021  Next annual : 9/12/2022    S/w pt to let her know we will send in RF of OCP to get her until her upcoming annual appt. She v/u

## 2022-09-06 NOTE — TELEPHONE ENCOUNTER
Pt has appt to establish with you on 10/27.  Pt has an OBGYN and has sent refill requests to them. They denied because pt needs an appt. Pt did not reschedule her August annual exam.  Junel was last filled on 6/14/22 #84 and no refills  Please advise if you are comfortable filling this.

## 2022-09-06 NOTE — TELEPHONE ENCOUNTER
Caller: Hyun Rosario    Relationship: Self    Best call back number: [phone]     Requested Prescriptions:   Requested Prescriptions     Pending Prescriptions Disp Refills   • norethindrone-ethinyl estradiol FE (Junel FE 1/20) 1-20 MG-MCG per tablet 84 tablet 0     Sig: Take 1 tablet by mouth Daily.        Pharmacy where request should be sent: [address] [phone]  - [phone] FX     Additional details provided by patient: PATIENT STATED THAT SHE NEEDS TO BEGIN A NEW PACK TODAY.     Does the patient have less than a 3 day supply:  [x] Yes  [] No    Edenilson Martino Rep   09/06/22 10:16 EDT

## 2022-09-08 RX ORDER — NORETHINDRONE ACETATE AND ETHINYL ESTRADIOL 1MG-20(21)
1 KIT ORAL DAILY
Qty: 84 TABLET | Refills: 0 | OUTPATIENT
Start: 2022-09-08

## 2022-09-12 ENCOUNTER — OFFICE VISIT (OUTPATIENT)
Dept: OBSTETRICS AND GYNECOLOGY | Facility: CLINIC | Age: 32
End: 2022-09-12

## 2022-09-12 VITALS
SYSTOLIC BLOOD PRESSURE: 110 MMHG | BODY MASS INDEX: 31.36 KG/M2 | WEIGHT: 177 LBS | HEIGHT: 63 IN | DIASTOLIC BLOOD PRESSURE: 80 MMHG

## 2022-09-12 DIAGNOSIS — Z30.41 ENCOUNTER FOR SURVEILLANCE OF CONTRACEPTIVE PILLS: ICD-10-CM

## 2022-09-12 DIAGNOSIS — Z80.3 FAMILY HISTORY OF BREAST CANCER: ICD-10-CM

## 2022-09-12 DIAGNOSIS — G43.009 MIGRAINE WITHOUT AURA AND WITHOUT STATUS MIGRAINOSUS, NOT INTRACTABLE: ICD-10-CM

## 2022-09-12 DIAGNOSIS — Z01.419 WOMEN'S ANNUAL ROUTINE GYNECOLOGICAL EXAMINATION: Primary | ICD-10-CM

## 2022-09-12 PROCEDURE — 99395 PREV VISIT EST AGE 18-39: CPT | Performed by: OBSTETRICS & GYNECOLOGY

## 2022-09-12 RX ORDER — NORETHINDRONE ACETATE AND ETHINYL ESTRADIOL 1MG-20(21)
1 KIT ORAL DAILY
Qty: 28 TABLET | Refills: 12 | Status: CANCELLED | OUTPATIENT
Start: 2022-09-12

## 2022-09-12 RX ORDER — LEVONORGESTREL AND ETHINYL ESTRADIOL 0.15-0.03
1 KIT ORAL DAILY
Qty: 84 TABLET | Refills: 3 | Status: SHIPPED | OUTPATIENT
Start: 2022-09-12 | End: 2023-03-28

## 2022-09-12 NOTE — PROGRESS NOTES
Gynecologic Annual Exam Note        Gynecologic Exam        Subjective     HPI  Hyun Rosario is a 32 y.o.  female who presents for annual well woman exam as a established patient. There were no changes to her medical or surgical history since her last visit.. Patient reports problems with: migraines. Has recently found out she has TMJ. But during tracking her migraines aprx 75% of her migraines occur during her period. Denies aura with her migranes. She states she would like to discuss options for helping without having to stop her birth control as they have been very helpful with her periods. Patient's last menstrual period was 2022.. Her periods are regular every 25-35 days, lasting 3 days. The flow is moderate. Dysmenorrhea:mild, occurring first 1-2 days of flow. . Partner Status: Marital Status: single.  She is sexually active. She has not had new partners.. STD testing recommendations have been explained to the patient and she does not desire STD testing.    Additional OB/GYN History   Current contraception: contraceptive methods: OCP (estrogen/progesterone)  Desires to: continue contraception  Thromboembolic Disease: none    History of STD: no    Last Pap : 21. Results: negative. HPV: not done  Last Completed Pap Smear          Ordered - PAP SMEAR (Every 3 Years) Ordered on 2021  SCANNED - PAP SMEAR    2019  Done    2017  Done - in [place]                 History of abnormal Pap smear: no  Gardasil status:completed  Family history of uterine, colon, breast, or ovarian cancer: yes - MGM had breast cancer X 2. Mother is BRCA + preventive double mastectomy. Pt. and sister plan to have testing done in the future. PGM had ovarian cancer  Performs monthly Self-Breast Exam: yes  Exercises Regularly:yes  Feelings of Anxiety or Depression: yes - doing well with Paxil  Tobacco Usage?: No       Current Outpatient Medications:   •  Azelastine-Fluticasone 137-50  MCG/ACT suspension, , Disp: , Rfl:   •  cetirizine (zyrTEC) 10 MG tablet, Take 1 tablet by mouth Daily., Disp: 90 tablet, Rfl: 0  •  levonorgestrel-ethinyl estradiol (SEASONALE) 0.15-0.03 MG per tablet, Take 1 tablet by mouth Daily., Disp: 84 tablet, Rfl: 3  •  montelukast (SINGULAIR) 10 MG tablet, , Disp: , Rfl:   •  PARoxetine (PAXIL) 40 MG tablet, Take 1 tablet by mouth Every Night., Disp: 90 tablet, Rfl: 0  •  Probiotic Product (PROBIOTIC DAILY PO), Take  by mouth., Disp: , Rfl:   •  rizatriptan (MAXALT) 10 MG tablet, , Disp: , Rfl:   •  TiZANidine (ZANAFLEX) 2 MG capsule, Take 1 capsule by mouth 3 (Three) Times a Day As Needed for Muscle Spasms., Disp: 60 capsule, Rfl: 1     Patient is requesting refills of Junel.    OB History        0    Para   0    Term   0       0    AB   0    Living   0       SAB   0    IAB   0    Ectopic   0    Molar   0    Multiple   0    Live Births   0                Health Maintenance   Topic Date Due   • ANNUAL PHYSICAL  Never done   • HEPATITIS C SCREENING  Never done   • Annual Gynecologic Pelvic and Breast Exam  2022   • INFLUENZA VACCINE  10/01/2022   • TDAP/TD VACCINES (2 - Td or Tdap) 2023   • PAP SMEAR  2024   • COVID-19 Vaccine  Completed   • Pneumococcal Vaccine 0-64  Aged Out       Past Medical History:   Diagnosis Date   • Acid reflux    • Generalized anxiety disorder 2021   • H/O seasonal allergies     grass and dust   • Migraines         Past Surgical History:   Procedure Laterality Date   • WISDOM TOOTH EXTRACTION         The additional following portions of the patient's history were reviewed and updated as appropriate: allergies, current medications, past family history, past medical history, past social history, past surgical history and problem list.    Review of Systems   Constitutional: Negative.    HENT: Negative.    Eyes: Negative.    Respiratory: Negative.    Cardiovascular: Negative.    Gastrointestinal: Negative.   "  Endocrine: Negative.    Genitourinary: Negative.    Musculoskeletal: Negative.    Skin: Negative.    Allergic/Immunologic: Negative.    Neurological: Positive for headache.   Hematological: Negative.    Psychiatric/Behavioral: Negative.          I have reviewed and agree with the HPI, ROS, and historical information as entered above. Lucinda Thomas MD        Objective   /80   Ht 160 cm (63\")   Wt 80.3 kg (177 lb)   LMP 09/01/2022   BMI 31.35 kg/m²     Physical Exam  Vitals and nursing note reviewed. Exam conducted with a chaperone present.   Constitutional:       Appearance: She is well-developed.   HENT:      Head: Normocephalic and atraumatic.   Neck:      Thyroid: No thyroid mass or thyromegaly.   Cardiovascular:      Rate and Rhythm: Normal rate and regular rhythm.      Heart sounds: No murmur heard.  Pulmonary:      Effort: Pulmonary effort is normal. No retractions.      Breath sounds: Normal breath sounds. No wheezing, rhonchi or rales.   Chest:      Chest wall: No mass or tenderness.   Breasts:      Right: Normal. No mass, nipple discharge, skin change or tenderness.      Left: Normal. No mass, nipple discharge, skin change or tenderness.       Abdominal:      General: Bowel sounds are normal.      Palpations: Abdomen is soft. Abdomen is not rigid. There is no mass.      Tenderness: There is no abdominal tenderness. There is no guarding.      Hernia: No hernia is present. There is no hernia in the left inguinal area or right inguinal area.   Genitourinary:     General: Normal vulva.      Exam position: Lithotomy position.      Pubic Area: No rash.       Labia:         Right: No rash, tenderness or lesion.         Left: No rash, tenderness or lesion.       Urethra: No urethral pain or urethral swelling.      Vagina: Normal. No vaginal discharge or lesions.      Cervix: No cervical motion tenderness, discharge, lesion or cervical bleeding.      Uterus: Normal. Not enlarged, not fixed and not " tender.       Adnexa:         Right: No mass, tenderness or fullness.          Left: No mass, tenderness or fullness.        Rectum: No external hemorrhoid.   Musculoskeletal:      Cervical back: Normal range of motion. No muscular tenderness.   Neurological:      Mental Status: She is alert and oriented to person, place, and time.   Psychiatric:         Behavior: Behavior normal.            Assessment and Plan    Problem List Items Addressed This Visit        Family History    Family history of breast cancer    Overview     MGM  from breast cancer, pt mother tested positive for BRCA gene and had a bilateral mastectomy.             Neuro    Migraine without aura and without status migrainosus, not intractable    Overview     2022-patient reports increase in migraines with menses.  We discussed treatment options.  We will start her on a continuous OCP, Seasonale.  We discussed magnesium oxide supplementation to help with headaches as well.  Patient  denies aura.  If this is not improved, we will get a neurology consult.         Relevant Medications    TiZANidine (ZANAFLEX) 2 MG capsule    rizatriptan (MAXALT) 10 MG tablet    PARoxetine (PAXIL) 40 MG tablet      Other Visit Diagnoses     Women's annual routine gynecological examination    -  Primary    Relevant Orders    LIQUID-BASED PAP SMEAR, [facility] ([facility])    Encounter for surveillance of contraceptive pills        Relevant Medications    levonorgestrel-ethinyl estradiol (SEASONALE) 0.15-0.03 MG per tablet          1. GYN annual well woman exam.   2. Reviewed pap guidelines.   3. Encouraged use of condoms for STD prevention.  4. OCP's/Vaginal Ring - Discussed side effects of nausea, BTB, headaches, breast tenderness and slight weight gain in the first three cycles.  Understands risks of blood clots, stroke, and theoretical risk of breast cancer.  Denies family history of blood clots.  5. Recommended use of Vitamin D replacement and getting  adequate calcium in her diet. (1500mg)  6. Reviewed monthly self breast exams.  Instructed to call with lumps, pain, or breast discharge.    7. Reviewed BMI and weight loss as preventative health measures.   8. Reviewed exercise as a preventative health measures.   9. Reccommended Flu Vaccine in Fall of each year.  10. RTC in 1 year or PRN with problems  Return in about 1 year (around 9/12/2023) for Annual physical.      Lucinda Thomas MD  09/12/2022

## 2022-09-13 LAB — REF LAB TEST METHOD: NORMAL

## 2022-10-18 DIAGNOSIS — F41.1 GENERALIZED ANXIETY DISORDER: ICD-10-CM

## 2022-10-18 DIAGNOSIS — F32.1 CURRENT MODERATE EPISODE OF MAJOR DEPRESSIVE DISORDER WITHOUT PRIOR EPISODE: ICD-10-CM

## 2022-10-19 RX ORDER — PAROXETINE HYDROCHLORIDE 40 MG/1
TABLET, FILM COATED ORAL
Qty: 30 TABLET | Refills: 0 | Status: SHIPPED | OUTPATIENT
Start: 2022-10-19 | End: 2022-10-27 | Stop reason: SDUPTHER

## 2022-10-27 ENCOUNTER — OFFICE VISIT (OUTPATIENT)
Dept: INTERNAL MEDICINE | Facility: CLINIC | Age: 32
End: 2022-10-27

## 2022-10-27 VITALS
HEART RATE: 85 BPM | TEMPERATURE: 97.9 F | DIASTOLIC BLOOD PRESSURE: 80 MMHG | HEIGHT: 63 IN | OXYGEN SATURATION: 98 % | WEIGHT: 178 LBS | RESPIRATION RATE: 16 BRPM | BODY MASS INDEX: 31.54 KG/M2 | SYSTOLIC BLOOD PRESSURE: 122 MMHG

## 2022-10-27 DIAGNOSIS — F41.1 GENERALIZED ANXIETY DISORDER: ICD-10-CM

## 2022-10-27 DIAGNOSIS — R23.8 IRRITATION SYMPTOM OF SKIN: ICD-10-CM

## 2022-10-27 DIAGNOSIS — Z76.89 ENCOUNTER TO ESTABLISH CARE: Primary | ICD-10-CM

## 2022-10-27 DIAGNOSIS — F32.1 CURRENT MODERATE EPISODE OF MAJOR DEPRESSIVE DISORDER WITHOUT PRIOR EPISODE: ICD-10-CM

## 2022-10-27 DIAGNOSIS — Z23 NEED FOR INFLUENZA VACCINATION: ICD-10-CM

## 2022-10-27 DIAGNOSIS — G43.819 OTHER MIGRAINE WITHOUT STATUS MIGRAINOSUS, INTRACTABLE: ICD-10-CM

## 2022-10-27 DIAGNOSIS — L21.9 SEBORRHEIC DERMATITIS: ICD-10-CM

## 2022-10-27 DIAGNOSIS — E66.3 OVERWEIGHT: ICD-10-CM

## 2022-10-27 PROCEDURE — 99214 OFFICE O/P EST MOD 30 MIN: CPT | Performed by: NURSE PRACTITIONER

## 2022-10-27 PROCEDURE — 90471 IMMUNIZATION ADMIN: CPT | Performed by: NURSE PRACTITIONER

## 2022-10-27 PROCEDURE — 90686 IIV4 VACC NO PRSV 0.5 ML IM: CPT | Performed by: NURSE PRACTITIONER

## 2022-10-27 RX ORDER — KETOCONAZOLE 20 MG/G
CREAM TOPICAL
Qty: 30 G | Refills: 0 | Status: SHIPPED | OUTPATIENT
Start: 2022-10-27 | End: 2023-03-28

## 2022-10-27 RX ORDER — MAGNESIUM 200 MG
1 TABLET ORAL DAILY
COMMUNITY

## 2022-10-27 RX ORDER — PAROXETINE HYDROCHLORIDE 40 MG/1
TABLET, FILM COATED ORAL
Qty: 90 TABLET | Refills: 1 | Status: SHIPPED | OUTPATIENT
Start: 2022-10-27

## 2022-10-27 NOTE — PROGRESS NOTES
New Patient Office Visit      Date: 10/27/2022   Patient Name: Hyun Rosario  : 1990   MRN: [id number]     Chief Complaint:    Chief Complaint   Patient presents with   • Establish Care   • Anxiety       History of Present Illness: Hyun Rosario is a 32 y.o. female who is here today to establish care.      She follows Dr. Thomas, OB/GYN, with recent Pap completed 2022.    Migraines- Reports increase in migraines with menses.  Current treatment includes tizanidine, Maxalt and she also most recently added magnesium oxide supplementation.  She also recently switched OCP.  Since this change in contraception and the addition of magnesium oxide she notes an overall improvement in migraine headaches.     Mood: MANNY/ depression- Previously tried/ failed zoloft.  Reports overall well controlled depression and anxiety on current dose of Paxil.  Previously experienced GI related symptoms and also was too thin-- which stemmed from uncontrolled anxiety/depression. Now no issues, continues doing well/feeling well.  She requests a refill with 90-day supply of this medication.  Her only complaint is that she has gained weight.  See below.       Weight gain- Weight has been increasing since starting Paxil.  States that she is no longer gaining, but at present she is heavier than she wishes to be.  Currently exercising, no routine regimen but she did recently buy an exercise bike.  Interested in weight loss plan.    Naval piercing- Recently got her bellybutton pierced.  Has been compliant with wound care instructions that were recommended by tattoo parlor.  Her cat recently bumped this area and it is now red.  She requests that I look at piercing today.    Skin dryness/itchiness- Areas of concern include her ears and outer portion of eyes.  Has tried over-the-counter topical steroid ointment with minimal to no relief.    Subjective      Review of Systems:   Review of Systems   Constitutional: Negative for activity change and  appetite change.   HENT: Negative.    Gastrointestinal: Negative.    Skin: Positive for color change, dry skin and wound.   Neurological: Negative for headache.   Psychiatric/Behavioral: Negative for sleep disturbance and depressed mood. The patient is not nervous/anxious.        Past Medical History:   Past Medical History:   Diagnosis Date   • Acid reflux    • Generalized anxiety disorder 7/27/2021   • H/O seasonal allergies     grass and dust   • Migraines        Past Surgical History:   Past Surgical History:   Procedure Laterality Date   • WISDOM TOOTH EXTRACTION         Family History:   Family History   Problem Relation Age of Onset   • No Known Problems Mother    • Hypertension Father    • Breast cancer Maternal Grandmother    • Ovarian cancer Paternal Grandmother        Social History:   Social History     Socioeconomic History   • Marital status: Single   Tobacco Use   • Smoking status: Never   • Smokeless tobacco: Never   Vaping Use   • Vaping Use: Never used   Substance and Sexual Activity   • Alcohol use: Yes     Comment: one glass of wine per month   • Drug use: No   • Sexual activity: Yes     Partners: Male     Birth control/protection: OCP       Medications:     Current Outpatient Medications:   •  Azelastine-Fluticasone 137-50 MCG/ACT suspension, , Disp: , Rfl:   •  cetirizine (zyrTEC) 10 MG tablet, Take 1 tablet by mouth Daily., Disp: 90 tablet, Rfl: 0  •  levonorgestrel-ethinyl estradiol (SEASONALE) 0.15-0.03 MG per tablet, Take 1 tablet by mouth Daily., Disp: 84 tablet, Rfl: 3  •  Magnesium 200 MG tablet, Take 1 tablet by mouth Daily., Disp: , Rfl:   •  montelukast (SINGULAIR) 10 MG tablet, , Disp: , Rfl:   •  Probiotic Product (PROBIOTIC DAILY PO), Take  by mouth., Disp: , Rfl:   •  rizatriptan (MAXALT) 10 MG tablet, , Disp: , Rfl:   •  TiZANidine (ZANAFLEX) 2 MG capsule, Take 1 capsule by mouth 3 (Three) Times a Day As Needed for Muscle Spasms., Disp: 60 capsule, Rfl: 1  •  ketoconazole  "(NIZORAL) 2 % cream, Apply to affected areas in thins layer BID as needed for 2 weeks., Disp: 30 g, Rfl: 0  •  PARoxetine (PAXIL) 40 MG tablet, Take one tablet PO qhs., Disp: 90 tablet, Rfl: 1    Allergies:   Allergies   Allergen Reactions   • Azithromycin Hives   • Amoxicillin Hives       Objective     Physical Exam:  Vital Signs:   Vitals:    10/27/22 1330   BP: 122/80   Pulse: 85   Resp: 16   Temp: 97.9 °F (36.6 °C)   SpO2: 98%   Weight: 80.7 kg (178 lb)   Height: 160 cm (63\")   PainSc: 0-No pain     Body mass index is 31.53 kg/m².  BMI is >= 30 and <35. (Class 1 Obesity). The following options were offered after discussion;: exercise counseling/recommendations, nutrition counseling/recommendations and referral to a nutritionist       Physical Exam  Vitals and nursing note reviewed.   Constitutional:       General: She is awake. She is not in acute distress.     Appearance: Normal appearance. She is well-developed. She is not ill-appearing or diaphoretic.   HENT:      Head: Normocephalic and atraumatic.      Right Ear: Tympanic membrane normal.      Left Ear: Tympanic membrane normal.      Ears:      Comments: Bilateral canal and external ears with erythematous, dry, pealing skin consistent with seborrheic dermatitis      Nose: Nose normal.      Mouth/Throat:      Mouth: Mucous membranes are moist.      Pharynx: Oropharynx is clear. No oropharyngeal exudate or posterior oropharyngeal erythema.   Eyes:      Extraocular Movements: Extraocular movements intact.      Pupils: Pupils are equal, round, and reactive to light.   Neck:      Vascular: No JVD.   Cardiovascular:      Rate and Rhythm: Normal rate and regular rhythm.      Heart sounds: Normal heart sounds.     No S3 or S4 sounds.   Pulmonary:      Effort: Pulmonary effort is normal. No respiratory distress.      Breath sounds: Normal breath sounds and air entry.   Abdominal:      General: Abdomen is flat. Bowel sounds are normal. There is no distension.      " Palpations: Abdomen is soft.      Tenderness: There is no abdominal tenderness. There is no guarding.   Musculoskeletal:      Cervical back: Neck supple. No rigidity.      Right lower leg: No edema.      Left lower leg: No edema.   Lymphadenopathy:      Cervical: No cervical adenopathy.   Skin:     General: Skin is warm and dry.      Capillary Refill: Capillary refill takes less than 2 seconds.      Findings: Wound (skin between puncture sites of naval ring is red with crusting of old drainage. No apparent new/ recent drainage. No edema. Appears improving. ) present.   Neurological:      General: No focal deficit present.      Mental Status: She is alert and oriented to person, place, and time. Mental status is at baseline.      Cranial Nerves: Cranial nerves are intact.   Psychiatric:         Attention and Perception: Attention and perception normal.         Mood and Affect: Mood and affect normal.         Speech: Speech normal.         Behavior: Behavior normal.         Thought Content: Thought content normal.         Judgment: Judgment normal.           Results:     Labs: reviewed     Imaging:     Assessment / Plan      Assessment/Plan:   Diagnoses and all orders for this visit:    1. Encounter to establish care (Primary)  -Offered to update labs today.  Patient declines noting that she has a fear of needles/blood draw.  She says that she needs to mentally prepare for phlebotomy.  We are not making any medication change and overall she is doing well.  We will plan to meet back in 3 months for her annual physical with fasting labs at that time.    2. Generalized anxiety disorder  -continue paxil as prescribed     3. Current moderate episode of major depressive disorder without prior episode (HCC)  -continue paxil as prescribed     4. Overweight  -Discussed that mental health is just as important as physical health.  Given therapeutic effect with Paxil, we will try diet and exercise without changing psych med.    -First recommended implementing regular, intentional exercise into your schedule  -Will refer to nutritional services for further advisement/ recommendation on appropriate dietary changes to meet BMI/ weight goals   -reassess weight in 3 months    5. Seborrheic dermatitis  -No improvement with topical steroid  -    ketoconazole (NIZORAL) 2 % cream; Apply to affected areas in thins layer BID as needed for 2 weeks.  Dispense: 30 g; Refill: 0  -Avoid cream near your eyes     6. Irritation symptom of skin  -Continue post-piercing wound care with saline spray, as recommended  -As long as naval continues to demonstrate improvement, ok to leave in. For any recurrence/ worsening redness swelling pain pus or fever you need to remove it and continue wound care    7. Other migraine without status migrainosus, intractable  -continue current treatment    8. Need for influenza vaccination  -     FluLaval/Fluarix/Fluzone >6 Months         Follow Up:   Return in about 3 months (around 1/27/2023) for Annual, labs with next visit.    SHEMAR Luna  [facility] [place]

## 2023-02-24 ENCOUNTER — TELEPHONE (OUTPATIENT)
Dept: OBSTETRICS AND GYNECOLOGY | Facility: CLINIC | Age: 33
End: 2023-02-24
Payer: MEDICAID

## 2023-02-24 NOTE — TELEPHONE ENCOUNTER
Patient last seen 9/12/22 by Martha. At that time she was complaining of migraines without aura mostly during her periods. She was started on continuous OCP. Pt states that since starting the birth control continuously she has started having migraines with aura. Her migraine specialist stated that she needs to discontinue the BC. Pt asking if she needs to be seen or if Martha can switch her BC without a visit. Patient plans to discontinue the BC today. Reminded pt she should use condoms to prevent pregnancy and that I will discuss with Martha on Monday and call her back. Pt VU

## 2023-02-24 NOTE — TELEPHONE ENCOUNTER
Caller: Hyun Rosario    Relationship: Self    Best call back number: [phone]    What medications are you currently taking:     JOLESSA - BIRTH CONTROL      Current Outpatient Medications on File Prior to Visit   Medication Sig Dispense Refill   • Azelastine-Fluticasone 137-50 MCG/ACT suspension      • cetirizine (zyrTEC) 10 MG tablet Take 1 tablet by mouth Daily. 90 tablet 0   • ketoconazole (NIZORAL) 2 % cream Apply to affected areas in thins layer BID as needed for 2 weeks. 30 g 0   • levonorgestrel-ethinyl estradiol (SEASONALE) 0.15-0.03 MG per tablet Take 1 tablet by mouth Daily. 84 tablet 3   • Magnesium 200 MG tablet Take 1 tablet by mouth Daily.     • montelukast (SINGULAIR) 10 MG tablet      • PARoxetine (PAXIL) 40 MG tablet Take one tablet PO qhs. 90 tablet 1   • Probiotic Product (PROBIOTIC DAILY PO) Take  by mouth.     • rizatriptan (MAXALT) 10 MG tablet      • TiZANidine (ZANAFLEX) 2 MG capsule Take 1 capsule by mouth 3 (Three) Times a Day As Needed for Muscle Spasms. 60 capsule 1     No current facility-administered medications on file prior to visit.          When did you start taking these medications: 5 MONTHS AGO    Which medication are you concerned about: ABDIEL B/C    Who prescribed you this medication: DR. FLORES    What are your concerns: PT STATES SHE HAS BEEN GETTING MIGRAINES AND SEEING WHITE AFTER SHE STARTED TAKING THIS MEDICATION - SHE WENT TO HER MIGRAINE SPECIALIST WHO ADVISED HER TO CONTACT OB/GYN TO SEE IF SHE SHOULD CHANGE BACK TO HER OTHER B/C AND STOP TAKING THE JOLESSA     PLEASE CALL PT TO DISCUSS / ADVISE    How long have you had these concerns: LAST COUPLE OF MONTHS

## 2023-02-27 NOTE — TELEPHONE ENCOUNTER
Spoke with Dr. Thomas. Pt can switch to a POP, Nexplanon or IUD. Explained the difference in Micronor and Slynd. Pt is interested in controlling her periods as well as having contraception. Pt would like to think about getting an IUD and will call back.

## 2023-02-28 ENCOUNTER — TELEPHONE (OUTPATIENT)
Dept: OBSTETRICS AND GYNECOLOGY | Facility: CLINIC | Age: 33
End: 2023-02-28
Payer: MEDICAID

## 2023-02-28 NOTE — TELEPHONE ENCOUNTER
S/w pt she states she is nervous about getting the IUD inserted and wanted to know if she can take tylenol or ibuprofen before her appt.     I advised patient that she could take tylenol or ibuprofen before her appt to help with the possible cramping. She v/u

## 2023-02-28 NOTE — TELEPHONE ENCOUNTER
PROVIDER: DR. FLORES    Caller: Hyun Rosario    Relationship to patient: Self    Best call back number: [phone]    Chief complaint: WANTS TO GET MIRENA AFTER SPEAKING WITH MARBIN    PT SPOKE W/MARBIN YESTERDAY IRT BIRTH CONTROL OPTIONS. SHE WOULD LIKE THE MIRENA OPTION.  NA AT NON-CLINICAL OR CLINICAL TO SET THAT UP PER REF TOOL.    COULD SOMEONE PLS CALL HER TO SET THAT UP? THANKS  CAN CALL ANYTIME AND CAN LVM IF NA.

## 2023-02-28 NOTE — TELEPHONE ENCOUNTER
CALLED TO SCHEDULE PT FOR IUD INSERTION. SHE HAD SOME QUESTIONS ABOUT PAIN ASSOCIATED WITH INSERTION AND IF THERE IS ANYTHING WE CAN SEND HER IN TO TAKE BEFORE HER APPOINTMENT OR IF THERE IS ANYTHING SHE CAN TAKE TO HELP WITH THE PAIN BEFORE.     PLEASE ADVISE

## 2023-03-01 PROBLEM — G43.109 MIGRAINE WITH AURA: Status: ACTIVE | Noted: 2023-03-01

## 2023-03-28 ENCOUNTER — OFFICE VISIT (OUTPATIENT)
Dept: OBSTETRICS AND GYNECOLOGY | Facility: CLINIC | Age: 33
End: 2023-03-28
Payer: MEDICAID

## 2023-03-28 ENCOUNTER — TELEPHONE (OUTPATIENT)
Dept: OBSTETRICS AND GYNECOLOGY | Facility: CLINIC | Age: 33
End: 2023-03-28

## 2023-03-28 VITALS
HEIGHT: 63 IN | DIASTOLIC BLOOD PRESSURE: 82 MMHG | SYSTOLIC BLOOD PRESSURE: 120 MMHG | WEIGHT: 188 LBS | BODY MASS INDEX: 33.31 KG/M2

## 2023-03-28 DIAGNOSIS — Z97.5 IUD (INTRAUTERINE DEVICE) IN PLACE: ICD-10-CM

## 2023-03-28 DIAGNOSIS — Z30.430 ENCOUNTER FOR IUD INSERTION: Primary | ICD-10-CM

## 2023-03-28 LAB
B-HCG UR QL: NEGATIVE
EXPIRATION DATE: NORMAL
INTERNAL NEGATIVE CONTROL: NORMAL
INTERNAL POSITIVE CONTROL: NORMAL
Lab: NORMAL

## 2023-03-28 RX ORDER — BUSPIRONE HYDROCHLORIDE 10 MG/1
TABLET ORAL
COMMUNITY
Start: 2023-03-05

## 2023-03-28 NOTE — PROGRESS NOTES
Gynecologic Procedure Note        Procedure: IUD Insertion     IUD insertion    Date/Time: 3/28/2023 12:16 PM  Performed by: Lucinda Thomas MD  Authorized by: Lucinda Thomas MD   Preparation: Patient was prepped and draped in the usual sterile fashion.  Local anesthesia used: no    Anesthesia:  Local anesthesia used: no    Sedation:  Patient sedated: no    Patient tolerance: patient tolerated the procedure well with no immediate complications          Pre procedure indication 1) Desires Mirena  Post procedure indication 1) cervical stenosis      She has not had unprotected IC since she stopped OCP's 1 month ago and denies having IC in the past 2 weeks.     The risks, benefits, and alternatives to Mirena were explained at length with the patient. All her questions were answered and consents were signed.  Her LMP was 3-7-2023 .  Urine Pregnancy Test was Negative.  Patient does not have an allergy to betadine or shellfish.    The patient was placed in a dorsal lithotomy position on the examining table in [facility]. A bimanual exam confirmed the uterus was midposition. A speculum was inserted into the vagina and the cervix was brought into view.  The cervix was prepped with Betadine. The anterior lip of the cervix was then grasped with a single-tooth tenaculum.  The sound was attempted to be placed however cervical stenosis was noted.  A dilator was attempted and did not become successful.  Hemostats were also tried to dilate and was unable to get through the cervix at the internal os.  At this point the procedure was aborted.  The single-tooth tenaculum was removed and Monsel's was placed over it for hemostasis.    The patient tolerated the procedure marginally with a moderate amount of discomfort.  She was monitored for 10 minutes prior to discharge.      There were no complications.    Patient was counseled that we could try Cytotec to help with cervical stenosis before trying again.  Patient is  unsure if she would like this versus Nexplanon.  Nexplanon pamphlet provided.  Patient will call the office to let us know.    Lucinda Thomas MD  03/28/2023

## 2023-03-28 NOTE — TELEPHONE ENCOUNTER
S/w pt she states she is more interested in getting the nexplanon and wanted to know if this would help with her menses.     I told the patient that she could have irregular vaginal bleeding for about 3-4 months after insertion but after that her menses should improve. She v/u

## 2023-04-06 ENCOUNTER — TELEMEDICINE (OUTPATIENT)
Dept: FAMILY MEDICINE CLINIC | Facility: TELEHEALTH | Age: 33
End: 2023-04-06
Payer: MEDICAID

## 2023-04-06 DIAGNOSIS — U07.1 COVID-19: Primary | ICD-10-CM

## 2023-04-06 PROCEDURE — 1160F RVW MEDS BY RX/DR IN RCRD: CPT | Performed by: NURSE PRACTITIONER

## 2023-04-06 PROCEDURE — 1159F MED LIST DOCD IN RCRD: CPT | Performed by: NURSE PRACTITIONER

## 2023-04-06 PROCEDURE — 99213 OFFICE O/P EST LOW 20 MIN: CPT | Performed by: NURSE PRACTITIONER

## 2023-04-06 RX ORDER — DEXTROMETHORPHAN HYDROBROMIDE AND PROMETHAZINE HYDROCHLORIDE 15; 6.25 MG/5ML; MG/5ML
5 SYRUP ORAL 4 TIMES DAILY PRN
Qty: 120 ML | Refills: 0 | Status: SHIPPED | OUTPATIENT
Start: 2023-04-06

## 2023-04-06 RX ORDER — BENZONATATE 200 MG/1
200 CAPSULE ORAL 3 TIMES DAILY PRN
Qty: 30 CAPSULE | Refills: 0 | Status: SHIPPED | OUTPATIENT
Start: 2023-04-06 | End: 2023-04-16

## 2023-04-06 RX ORDER — PREDNISONE 20 MG/1
20 TABLET ORAL 3 TIMES DAILY
Qty: 9 TABLET | Refills: 0 | Status: SHIPPED | OUTPATIENT
Start: 2023-04-06 | End: 2023-04-09

## 2023-04-06 NOTE — PROGRESS NOTES
HPI  Hyun Rosario is a 33 y.o. female  presents with complaint of testing positive for covid yesterday. Symptoms started 4/4 with fever, sore throat, runny nose, sneezing, chills, sweats, body aches, cough, diarrhea. Denies SOA, CP, N/V. Has taken advil, tylenol, sudafed, increased fluid intake, throat lozenges. Takes nasal spray, singulair, zyrtec daily. She states main complaint is sore throat; does not see any redness or white patches; no known exposure to strep; does not feel like strep she had in past.    Review of Systems    Past Medical History:   Diagnosis Date   • Acid reflux    • Generalized anxiety disorder 7/27/2021   • H/O seasonal allergies     grass and dust   • Migraines        Family History   Problem Relation Age of Onset   • No Known Problems Mother    • Hypertension Father    • Breast cancer Maternal Grandmother    • Ovarian cancer Paternal Grandmother        Social History     Socioeconomic History   • Marital status: Single   Tobacco Use   • Smoking status: Never   • Smokeless tobacco: Never   Vaping Use   • Vaping Use: Never used   Substance and Sexual Activity   • Alcohol use: Yes     Comment: one glass of wine per month   • Drug use: No   • Sexual activity: Yes     Partners: Male     Birth control/protection: OCP         LMP 03/07/2023     PHYSICAL EXAM  Physical Exam   Constitutional: She appears well-developed and well-nourished.   HENT:   Head: Normocephalic.   Nose: Nose normal.   Neck: Neck normal appearance.  Pulmonary/Chest: Effort normal.   Neurological: She is alert.   Psychiatric: She has a normal mood and affect. Her speech is normal.       Diagnoses and all orders for this visit:    1. COVID-19 (Primary)  -     predniSONE (DELTASONE) 20 MG tablet; Take 1 tablet by mouth 3 (Three) Times a Day for 3 days.  Dispense: 9 tablet; Refill: 0  -     benzonatate (TESSALON) 200 MG capsule; Take 1 capsule by mouth 3 (Three) Times a Day As Needed for Cough for up to 10 days.  Dispense: 30  capsule; Refill: 0  -     promethazine-dextromethorphan (PROMETHAZINE-DM) 6.25-15 MG/5ML syrup; Take 5 mL by mouth 4 (Four) Times a Day As Needed for Cough.  Dispense: 120 mL; Refill: 0      Instructed to f/u if sore throat does not improve in the next 2-3 days.     FOLLOW-UP  As discussed during visit with [facility], if symptoms worsen or fail to improve, follow-up with PCP/Urgent Care/Emergency Department.    Patient verbalizes understanding of medications, instructions for treatment and follow-up.    Cindy Borden, APRN  04/06/2023  11:13 EDT    The use of a video visit has been reviewed with the patient and verbal informed consent has been obtained. Myself and Hyun Rosario participated in this visit. The patient is located in [place], [place], and I am located in [place], [place]. [employer] and [employer] were utilized.

## 2023-04-06 NOTE — PATIENT INSTRUCTIONS
How to Quarantine at Home  Information for Patients and Families    These instructions are for people with confirmed or suspected COVID-19 who do not need to be hospitalized and those with confirmed COVID-19 who were hospitalized and discharged to care for themselves at home.    If you were tested through the Health Department  The Health Department will monitor your wellbeing.  If it is determined that you do not need to be hospitalized and can be isolated at home, you will be monitored by staff from your local or state health department.     If you were tested through a Commercial Lab  You will need to monitor yourself and report changes in your symptoms to your doctor.  See the section below called Monitor Your Symptoms.    Follow these steps until a healthcare provider or local or state health department says you can return to your normal activities.    Stay home except to get medical care  Restrict activities outside your home, except for getting medical care.   Do not go to work, school, or public areas.   Avoid using public transportation, ride-sharing, or taxis.    Separate yourself from other people and animals in your home  People  As much as possible, you should stay in a specific room and away from other people in your home. Also, you should use a separate bathroom, if available.    Animals  You should restrict contact with pets and other animals while you are sick with COVID-19, just like you would around other people. When possible, have another member of your household care for your animals while you are sick. If you are sick with COVID-19, avoid contact with your pet, including petting, snuggling, being kissed or licked, and sharing food. If you must care for your pet or be around animals while you are sick, wash your hands before and after you interact with pets and wear a facemask. See COVID-19 and Animals for more information.    Call ahead before visiting your doctor  If you have a medical  appointment, call the healthcare provider and tell them that you have or may have COVID-19. This information will help the healthcare provider’s office take steps to keep other people from getting infected or exposed.    Wear a facemask  You should wear a facemask when you are around other people (e.g., sharing a room or vehicle) or pets and before you enter a healthcare provider’s office.     If you are not able to wear a facemask (for example, because it causes trouble breathing), then people who live with you should not stay in the same room with you, or they should wear a facemask if they enter your room.    Cover your coughs and sneezes  Cover your mouth and nose with a tissue when you cough or sneeze.   Throw used tissues in a lined trash can.   Immediately wash your hands with soap and water for at least 20 seconds or, if soap and water are not available, clean your hands with an alcohol-based hand  that contains at least 60% alcohol.    Clean your hands often  Wash your hands often with soap and water for at least 20 seconds, especially after blowing your nose, coughing, or sneezing; going to the bathroom; and before eating or preparing food.     If soap and water are not readily available, use an alcohol-based hand  with at least 60% alcohol, covering all surfaces of your hands and rubbing them together until they feel dry.    Soap and water are the best option if hands are visibly dirty. Avoid touching your eyes, nose, and mouth with unwashed hands.    Avoid sharing personal household items  You should not share dishes, drinking glasses, cups, eating utensils, towels, or bedding with other people or pets in your home.   After using these items, they should be washed thoroughly with soap and water.    Clean all “high-touch” surfaces everyday  High touch surfaces include counters, tabletops, doorknobs, bathroom fixtures, toilets, phones, keyboards, tablets, and bedside tables.   Also, clean  any surfaces that may have blood, stool, or body fluids on them.   Use a household cleaning spray or wipe, according to the label instructions. Labels contain instructions for safe and effective use of the cleaning product, including precautions you should take when applying the product, such as wearing gloves and making sure you have good ventilation during use of the product.    Monitor your symptoms  Seek prompt medical attention if your illness is worsening (e.g., difficulty breathing).   Before seeking care, call your healthcare provider and tell them that you have, or are being evaluated for, COVID-19.   Put on a facemask before you enter the facility.     These steps will help the healthcare provider’s office to keep other people in the office or waiting room from getting infected or exposed.   Persons who are placed under active monitoring or facilitated self-monitoring should follow instructions provided by their local health department or occupational health professionals, as appropriate.  If you have a medical emergency and need to call 911, notify the dispatch personnel that you have, or are being evaluated for COVID-19. If possible, put on a facemask before emergency medical services arrive.    Discontinuing home isolation  Patients with confirmed COVID-19 should remain under home isolation precautions until the risk of secondary transmission to others is thought to be low. The decision to discontinue home isolation precautions should be made on a case-by-case basis, in consultation with healthcare providers and state and local health departments.    The below content are for household members, intimate partners, and caregivers of a patient with symptomatic laboratory-confirmed COVID-19 or a patient under investigation:    Household members, intimate partners, and caregivers may have close contact with a person with symptomatic, laboratory-confirmed COVID-19 or a person under investigation.     Close  contacts should monitor their health; they should call their healthcare provider right away if they develop symptoms suggestive of COVID-19 (e.g., fever, cough, shortness of breath)     Close contacts should also follow these recommendations:  Make sure that you understand and can help the patient follow their healthcare provider’s instructions for medication(s) and care. You should help the patient with basic needs in the home and provide support for getting groceries, prescriptions, and other personal needs.  Monitor the patient’s symptoms. If the patient is getting sicker, call his or her healthcare provider and tell them that the patient has laboratory-confirmed COVID-19. This will help the healthcare provider’s office take steps to keep other people in the office or waiting room from getting infected. Ask the healthcare provider to call the local or [facility] health department for additional guidance. If the patient has a medical emergency and you need to call 911, notify the dispatch personnel that the patient has, or is being evaluated for COVID-19.  Household members should stay in another room or be  from the patient as much as possible. Household members should use a separate bedroom and bathroom, if available.  Prohibit visitors who do not have an essential need to be in the home.  Household members should care for any pets in the home. Do not handle pets or other animals while sick.  For more information, see COVID-19 and Animals.  Make sure that shared spaces in the home have good air flow, such as by an air conditioner or an opened window, weather permitting.  Perform hand hygiene frequently. Wash your hands often with soap and water for at least 20 seconds or use an alcohol-based hand  that contains 60 to 95% alcohol, covering all surfaces of your hands and rubbing them together until they feel dry. Soap and water should be used preferentially if hands are visibly dirty.  Avoid touching  your eyes, nose, and mouth with unwashed hands.  The patient should wear a facemask when you are around other people. If the patient is not able to wear a facemask (for example, because it causes trouble breathing), you, as the caregiver, should wear a mask when you are in the same room as the patient.  Wear a disposable facemask and gloves when you touch or have contact with the patient’s blood, stool, or body fluids, such as saliva, sputum, nasal mucus, vomit, or urine.   Throw out disposable facemasks and gloves after using them. Do not reuse.  When removing personal protective equipment, first remove and dispose of gloves. Then, immediately clean your hands with soap and water or alcohol-based hand . Next, remove and dispose of facemask, and immediately clean your hands again with soap and water or alcohol-based hand .  Avoid sharing household items with the patient. You should not share dishes, drinking glasses, cups, eating utensils, towels, bedding, or other items. After the patient uses these items, you should wash them thoroughly (see below “Wash laundry thoroughly”).  Clean all “high-touch” surfaces, such as counters, tabletops, doorknobs, bathroom fixtures, toilets, phones, keyboards, tablets, and bedside tables, every day. Also, clean any surfaces that may have blood, stool, or body fluids on them.   Use a household cleaning spray or wipe, according to the label instructions. Labels contain instructions for safe and effective use of the cleaning product including precautions you should take when applying the product, such as wearing gloves and making sure you have good ventilation during use of the product.  Wash laundry thoroughly.   Immediately remove and wash clothes or bedding that have blood, stool, or body fluids on them.  Wear disposable gloves while handling soiled items and keep soiled items away from your body. Clean your hands (with soap and water or an alcohol-based hand  ) immediately after removing your gloves.  Read and follow directions on labels of laundry or clothing items and detergent. In general, using a normal laundry detergent according to washing machine instructions and dry thoroughly using the warmest temperatures recommended on the clothing label.  Place all used disposable gloves, facemasks, and other contaminated items in a lined container before disposing of them with other household waste. Clean your hands (with soap and water or an alcohol-based hand ) immediately after handling these items. Soap and water should be used preferentially if hands are visibly dirty.  Discuss any additional questions with your state or local health department or healthcare provider.    Adapted from information provided by the Centers for Disease Control and Prevention.  For more information, visit https://www.cdc.gov/coronavirus/2019-ncov/hcp/guidance-prevent-spread.html10 Things You Can Do to Manage Your COVID-19 Symptoms at Home  If you have possible or confirmed COVID-19  Stay home except to get medical care.  Monitor your symptoms carefully. If your symptoms get worse, call your healthcare provider immediately.  Get rest and stay hydrated.  If you have a medical appointment, call the healthcare provider ahead of time and tell them that you have or may have COVID-19.  For medical emergencies, call 911 and notify the dispatch personnel that you have or may have COVID-19.  Cover your cough and sneezes with a tissue or use the inside of your elbow.  Wash your hands often with soap and water for at least 20 seconds or clean your hands with an alcohol-based hand  that contains at least 60% alcohol.  As much as possible, stay in a specific room and away from other people in your home. Also, you should use a separate bathroom, if available. If you need to be around other people in or outside of the home, wear a mask.  Avoid sharing personal items with other  people in your household, like dishes, towels, and bedding.  Clean all surfaces that are touched often, like counters, tabletops, and doorknobs. Use household cleaning sprays or wipes according to the label instructions.  cdc.gov/coronavirus  07/16/2021  This information is not intended to replace advice given to you by your health care provider. Make sure you discuss any questions you have with your health care provider.  Document Revised: 11/02/2022 Document Reviewed: 11/02/2022  [employer] Patient Education © 2022 [employer]

## 2023-04-14 ENCOUNTER — TELEPHONE (OUTPATIENT)
Dept: INTERNAL MEDICINE | Facility: CLINIC | Age: 33
End: 2023-04-14
Payer: MEDICAID

## 2023-04-14 NOTE — TELEPHONE ENCOUNTER
Caller: Kt Rosario    Relationship: Self    Best call back number: [phone]    What is the best time to reach you: ANY TIME    Who are you requesting to speak with (clinical staff, provider,  specific staff member): CLINICAL STAFF    Do you know the name of the person who called: KT    What was the call regarding: PATIENT IS REQUESTING A CALL BACK TO DISCUSS COVID SYMPTOM PROTOCOL. SHE STATES THAT SHE IS STILL EXPERIENCING SYMPTOMS 5 DAYS AFTER TESTING POSITIVE FOR COVID AND WOULD LIKE TO KNOW IF SHE IS STILL CONTAGIOUS OR IF THERE IS ANYTHING ELSE SHE SHOULD KNOW.    Do you require a callback: YES

## 2023-05-24 DIAGNOSIS — F41.1 GENERALIZED ANXIETY DISORDER: ICD-10-CM

## 2023-05-24 DIAGNOSIS — F32.1 CURRENT MODERATE EPISODE OF MAJOR DEPRESSIVE DISORDER WITHOUT PRIOR EPISODE: ICD-10-CM

## 2023-05-26 RX ORDER — PAROXETINE HYDROCHLORIDE 40 MG/1
TABLET, FILM COATED ORAL
Qty: 90 TABLET | Refills: 1 | Status: SHIPPED | OUTPATIENT
Start: 2023-05-26

## 2023-09-06 NOTE — PROGRESS NOTES
"Subjective   Chief Complaint   Patient presents with    Follow-up     Wants to discuss the best birth control options for her.       Hyun Rosario is a 33 y.o. year old .  Patient's last menstrual period was 2023 (exact date).  She presents to discuss contraception. She has a history of migraines with an aura, and was initially started on a low dose OCP but continued to have migraines with aura. She tried an IUD but was unable to get it placed due to cervical stenosis. She has been off the pills for about 3-4 periods and is having worsening, heavy periods off of contraception.     The following portions of the patient's history were reviewed and updated as appropriate:current medications, allergies, past medical history, and past surgical history    Social History    Tobacco Use      Smoking status: Never      Smokeless tobacco: Never         Objective   /80 (BP Location: Right arm, Patient Position: Sitting, Cuff Size: Adult)   Resp 14   Ht 160 cm (63\")   Wt 85 kg (187 lb 8 oz)   LMP 2023 (Exact Date)   Breastfeeding No   BMI 33.21 kg/m²     Lab Review   No data reviewed    Imaging   No data reviewed        Assessment   Contraception counseling   Migraines with aura      Plan   Patient is a candidate for progesterone only options due to migraines with an aura including: IUD, Nexplanon, DepoProvera, POP. After discussion and counseling, patient elects for continuous use POP for cycle control. Samples and savings card given in clinic today for Slynd.  Discussed may take up to 3 to 4 months to feel full effects of medication.  If patient has any issues obtaining medication due to insurance and self-pay, patient will call we will switch to [employer]ron[employer] at that time.  All questions answered to the best my ability.  The importance of keeping all planned follow-up and taking all medications as prescribed was emphasized.  Follow up for annual exam in 6 months or sooner PRN     New Medications " Ordered This Visit   Medications    Drospirenone 4 MG tablet     Sig: Take 1 tablet by mouth Daily.     Dispense:  28 tablet     Refill:  11          This note was electronically signed.    Coby Cobian MD   September 7, 2023

## 2023-09-07 ENCOUNTER — OFFICE VISIT (OUTPATIENT)
Dept: OBSTETRICS AND GYNECOLOGY | Facility: CLINIC | Age: 33
End: 2023-09-07

## 2023-09-07 VITALS
DIASTOLIC BLOOD PRESSURE: 80 MMHG | WEIGHT: 187.5 LBS | RESPIRATION RATE: 14 BRPM | HEIGHT: 63 IN | BODY MASS INDEX: 33.22 KG/M2 | SYSTOLIC BLOOD PRESSURE: 124 MMHG

## 2023-09-07 DIAGNOSIS — Z30.09 ENCOUNTER FOR COUNSELING REGARDING CONTRACEPTION: Primary | ICD-10-CM

## 2023-09-08 ENCOUNTER — PATIENT ROUNDING (BHMG ONLY) (OUTPATIENT)
Dept: OBSTETRICS AND GYNECOLOGY | Facility: CLINIC | Age: 33
End: 2023-09-08

## 2023-09-08 NOTE — PROGRESS NOTES
A [employer]ach[employer] message has been sent to the patient for PATIENT ROUNDING with [facility] [place].

## 2024-03-10 NOTE — PROGRESS NOTES
Subjective   Chief Complaint   Patient presents with    Annual Exam     No c/c     Hyun Rosario is a 34 y.o. year old  presenting to be seen for her annual exam. She is doing well and has no complaints.     Last pap 2022: NILM, HPV neg   HPV vaccine: completed per patient   Family history of mother with BRCA2 positive. S/p prophylactic mastectomy in her 40s, never diagnosed with cancer.  Still deciding on oophorectomy.  Patient also with grandmother with breast cancer in her 40s, no genetic testing available at that time.    SEXUAL Hx:  She is not currently sexually active.  In the past year there there has been NO new sexual partners.    Condoms are always used.  She would not like to be screened for STD's at today's exam.  Current birth control method: OCP (progestin only).  She is happy with her current method of contraception and does not want to discuss alternative methods of contraception.  MENSTRUAL Hx:  No LMP recorded. (Menstrual status: Oral contraceptives).  In the past 6 months her cycles have been infrequent.  Her menstrual flow is typically light.     Post-coital bleeding is n/a.  Dysmenorrhea: mild  PMS: none  Her cycles are not a source of concern for her that she wishes to discuss today.  HEALTH Hx:  She exercises regularly: yes. Jose J!  She wears her seat belt: yes.  She has concerns about domestic violence: no.      The following portions of the patient's history were reviewed and updated as appropriate:problem list, current medications, allergies, past family history, past medical history, past social history, and past surgical history.    Social History    Tobacco Use      Smoking status: Never      Smokeless tobacco: Never         Objective   /78 (BP Location: Right arm, Patient Position: Sitting, Cuff Size: Adult)   Wt 78.9 kg (174 lb)   BMI 30.82 kg/m²     General:  well developed; well nourished  no acute distress   Skin:  No suspicious lesions seen   Breasts:  Examined in  supine position  Symmetric without masses or skin dimpling  Nipples normal without inversion, lesions or discharge  There are no palpable axillary nodes   Pelvis: Clinical staff was present for exam  External genitalia:  normal appearance of the external genitalia including Bartholin's and [place]'s glands.  :  urethral meatus normal;  Vaginal:  normal pink mucosa without prolapse or lesions.  Cervix:  normal appearance. ectropian present;  Uterus:  normal size, shape and consistency.  Adnexa:  normal bimanual exam of the adnexa.  Rectal:  digital rectal exam not performed; anus visually normal appearing.        Assessment   Annual GYN exam   Contraception surveillance   Mother with BRCA 2 positive      Plan   Pap was not done today.  I explained to Hyun that the recommendations for Pap smear interval in a low risk patient has lengthened to 5 years time.  I told Hyun she still needs to be seen in our office yearly for a full physical including breast and pelvic exam.   Continue Slynd daily.   Extensively discussed BRCA2 and recommendation for genetic screening due to significantly increased overall lifetime risk of breast and ovarian cancer if patient is a carrier.  Also discussed indications for early screening mammogram and MRI.  Patient would like to talk with her sister, but is very interested in moving forward with testing.  She will call or send a cheerapp message when she elects to have this done.    The importance of keeping all planned follow-up and taking all medications as prescribed was emphasized.  Follow up for annual exam in 1 year or sooner PRN     No orders of the defined types were placed in this encounter.         This note was electronically signed.    Coby Cobian MD   March 11, 2024

## 2024-03-11 ENCOUNTER — OFFICE VISIT (OUTPATIENT)
Dept: OBSTETRICS AND GYNECOLOGY | Facility: CLINIC | Age: 34
End: 2024-03-11
Payer: COMMERCIAL

## 2024-03-11 VITALS — WEIGHT: 174 LBS | BODY MASS INDEX: 30.82 KG/M2 | SYSTOLIC BLOOD PRESSURE: 110 MMHG | DIASTOLIC BLOOD PRESSURE: 78 MMHG

## 2024-03-11 DIAGNOSIS — Z30.41 ENCOUNTER FOR SURVEILLANCE OF CONTRACEPTIVE PILLS: ICD-10-CM

## 2024-03-11 DIAGNOSIS — Z80.3 FAMILY HISTORY OF BREAST CANCER: ICD-10-CM

## 2024-03-11 DIAGNOSIS — Z01.419 WOMEN'S ANNUAL ROUTINE GYNECOLOGICAL EXAMINATION: Primary | ICD-10-CM

## 2024-03-11 DIAGNOSIS — Z84.81 FAMILY HISTORY OF BRCA2 GENE POSITIVE: ICD-10-CM

## 2024-03-11 PROCEDURE — 99395 PREV VISIT EST AGE 18-39: CPT | Performed by: STUDENT IN AN ORGANIZED HEALTH CARE EDUCATION/TRAINING PROGRAM

## 2024-03-11 RX ORDER — ALPRAZOLAM 0.5 MG/1
TABLET ORAL
COMMUNITY
Start: 2024-01-23

## 2024-03-11 RX ORDER — FLUOXETINE HYDROCHLORIDE 40 MG/1
CAPSULE ORAL
COMMUNITY
Start: 2023-12-22

## 2024-06-21 ENCOUNTER — TELEPHONE (OUTPATIENT)
Dept: OBSTETRICS AND GYNECOLOGY | Facility: CLINIC | Age: 34
End: 2024-06-21

## 2024-06-21 NOTE — TELEPHONE ENCOUNTER
Caller: Hyun Rosario    Relationship: Self    Best call back number: [phone] (home)       Requested Prescriptions:   Requested Prescriptions     Pending Prescriptions Disp Refills    Drospirenone 4 MG tablet 28 tablet 11     Sig: Take 1 tablet by mouth Daily.        Pharm[facility] where [facility] be sent[id number] - [place], [place] - [address] - [phone]  - [phone] FX     Last office visit with prescribing clinician: 3/11/2024   Last telemedicine visit with prescribing clinician: Visit date not found   Next office visit with prescribing clinician: 3/17/2025       Does the patient have less than a 3 day supply:  [x] Yes  [] No    Would you like a call back once the refill request has been completed: [x] Yes [] No    If the office needs to give you a call back, can they leave a voicemail: [x] Yes [] No    PT CALLED IN STATING THAT HER NORMAL PHARMACY IS HAVING A POWER OUTAGE AND SHE WOULD LIKE TO HAVE THIS REFILLED AT THE ABOVE PHARMACY. CURRENTLY ONLY HAS ONE DAY LEFT. OK TO RESPOND THROUGH [facility].    Yakelin Giles   06/21/24 13:17 EDT

## 2024-06-26 ENCOUNTER — TELEPHONE (OUTPATIENT)
Dept: OBSTETRICS AND GYNECOLOGY | Facility: CLINIC | Age: 34
End: 2024-06-26
Payer: COMMERCIAL

## 2024-06-26 NOTE — TELEPHONE ENCOUNTER
Script was sent in (to local pharmacy, so that she doesn't have to wait for the mail order before vacation).

## 2024-06-26 NOTE — TELEPHONE ENCOUNTER
Pt received b/c ffrom her mail pharmacy normally send 90 days supply only received 30 day --- she is leaving on a trip next month and will need more - can we call this in for her   Not sure  why only received a 30 day supply  [facility] in [place]

## 2024-07-08 RX ORDER — DROSPIRENONE 4 MG/1
1 TABLET, FILM COATED ORAL DAILY
Qty: 28 TABLET | Refills: 11 | OUTPATIENT
Start: 2024-07-08

## 2024-07-08 NOTE — TELEPHONE ENCOUNTER
[facility] called and this pt needs a refills for 3 months if possible on her BC as she is leaving to go out of town for awhile. She is about 1/2 way through her prescription at this time.     Please advise    Thank you

## 2024-07-10 ENCOUNTER — TELEPHONE (OUTPATIENT)
Dept: OBSTETRICS AND GYNECOLOGY | Facility: CLINIC | Age: 34
End: 2024-07-10
Payer: COMMERCIAL

## 2024-07-10 NOTE — TELEPHONE ENCOUNTER
PT said she spoke with [facility] and they denied the 3 month supply of her BC. She is asking if we could just call in 30 day supply as she is on her last week of BC and is getting ready to go out of town.     Thank you

## 2024-07-10 NOTE — TELEPHONE ENCOUNTER
Called and spoke with patient - she states that she is needing a refill sent to [facility], looks like last Rx was sent locally, not [facility], resending Rx to [facility].  She will call [facility] today and if for some reason she is not able to get that sent over to her in time for her leaving town on Sunday, she will call back tomorrow and let us know to see if we have any samples, etc.

## 2024-12-02 ENCOUNTER — TELEPHONE (OUTPATIENT)
Dept: OBSTETRICS AND GYNECOLOGY | Facility: CLINIC | Age: 34
End: 2024-12-02
Payer: COMMERCIAL

## 2024-12-02 NOTE — TELEPHONE ENCOUNTER
Attempted to contact pt and there was no answer.  Message was left for them to give the office a call back.   Need to verify pharmacy and refill was sent in July 2024.

## 2024-12-02 NOTE — TELEPHONE ENCOUNTER
Spoke with pt and where she skips her placebo pills she is needing something on the prescription showing this so she can get her refills through the pharmacy.

## 2025-02-14 ENCOUNTER — TELEPHONE (OUTPATIENT)
Dept: OBSTETRICS AND GYNECOLOGY | Facility: CLINIC | Age: 35
End: 2025-02-14
Payer: COMMERCIAL

## 2025-02-14 RX ORDER — DROSPIRENONE 4 MG/1
1 TABLET, FILM COATED ORAL DAILY
Qty: 84 TABLET | Refills: 3 | Status: SHIPPED | OUTPATIENT
Start: 2025-02-14

## 2025-02-20 ENCOUNTER — PRIOR AUTHORIZATION (OUTPATIENT)
Dept: OBSTETRICS AND GYNECOLOGY | Facility: CLINIC | Age: 35
End: 2025-02-20
Payer: COMMERCIAL

## 2025-02-20 NOTE — TELEPHONE ENCOUNTER
Eufemia was approved from 1-21-25 until 2-20-26.  Case ID# [id number].  Matt at [facility] aware and states that Rx was already shipped and that the pt should receive by 6 o'clock tonight.  Left message for pt pt contact the office.

## 2025-03-16 NOTE — PROGRESS NOTES
"Subjective   Chief Complaint   Patient presents with    Gynecologic Exam     Annual.     Hyun Rosario is a 35 y.o. year old  presenting to be seen for her annual exam. She is doing well and has no GYN complaints.     Last pap 2022: NILM, HPV neg   HPV vaccine: completed per patient   Family history of mother with BRCA2 positive. S/p prophylactic mastectomy in her 40s, never diagnosed with cancer.  Still deciding on oophorectomy.  Patient also with grandmother with breast cancer in her 40s (age 43), no genetic testing available at that time.    She works as a publications coordinator at [employer]     SEXUAL Hx:  She is not currently sexually active.  In the past year there there has been NO new sexual partners.    Condoms are never used.  She would not like to be screened for STD's at today's exam.  Current birth control method: OCP (progestin only).  She is happy with her current method of contraception and does not want to discuss alternative methods of contraception.  MENSTRUAL Hx:  No LMP recorded (lmp unknown). (Menstrual status: Oral contraceptives).  In the past 6 months her cycles have been absent.    Intermenstrual bleeding is absent.    Post-coital bleeding is absent.  Dysmenorrhea: none  PMS: none  Her cycles are not a source of concern for her that she wishes to discuss today.  HEALTH Hx:  She exercises regularly: no (but is planning to start exercising more).  She wears her seat belt: yes.  She has concerns about domestic violence: no.    The following portions of the patient's history were reviewed and updated as appropriate:problem list, current medications, allergies, past family history, past medical history, past social history, and past surgical history.    Social History    Tobacco Use      Smoking status: Never      Smokeless tobacco: Never         Objective   /70 (BP Location: Left arm, Patient Position: Sitting, Cuff Size: Adult)   Ht 160 cm (63\")   Wt 80.7 kg " (178 lb)   LMP  (LMP Unknown)   Breastfeeding No   BMI 31.53 kg/m²     General:  well developed; well nourished  no acute distress  mentation appropriate  obese - Body mass index is 31.53 kg/m².   Breasts:  Examined in supine position  Symmetric without masses or skin dimpling  Nipples normal without inversion, lesions or discharge  There are no palpable axillary nodes  Fibrocystic changes are present both breasts without a discrete mass   Pelvis: Clinical staff was present for exam  External genitalia:  normal appearance of the external genitalia including Bartholin's and [place]'s glands.  :  urethral meatus normal; urethra normal:  Vaginal:  normal pink mucosa without prolapse or lesions.  Cervix:  normal appearance. ectropian present;  Uterus:  normal size, shape and consistency.  Adnexa:  normal bimanual exam of the adnexa.  Rectal:  digital rectal exam not performed; anus visually normal appearing.        Assessment   Annual GYN exam   Family history of BRCA 2  Contraception surveillance      Plan   Pap was not done today.  I explained to Hyun that the recommendations for Pap smear interval in a low risk patient has lengthened to 5 years time.  I told Hyun she still needs to be seen in our office yearly for a full physical including breast and pelvic exam.   Recommend continuous use Slynd. Refill placed today.   Referral placed today for Genetic Counseling today for BRCA 2 and mother with breast cancer at age 43. Will also reach out to Genetics and Breast Imaging for possible early screening recommendations as patient is now at 10 years younger than her primary relative at time of diagnosis.   The importance of keeping all planned follow-up and taking all medications as prescribed was emphasized.  Follow up for annual exam in 1 year or sooner PRN      New Medications Ordered This Visit   Medications    Drospirenone (Slynd) 4 MG tablet     Sig: Take 1 tablet by mouth Daily.     Dispense:  84 tablet      Refill:  3     PATIENT IS SKIPPING PLACEBO PILLS AND WILL NEED SOONER REFILLS*          This note was electronically signed.    Coby Cobian MD   March 17, 2025

## 2025-03-17 ENCOUNTER — OFFICE VISIT (OUTPATIENT)
Dept: OBSTETRICS AND GYNECOLOGY | Facility: CLINIC | Age: 35
End: 2025-03-17
Payer: COMMERCIAL

## 2025-03-17 VITALS
SYSTOLIC BLOOD PRESSURE: 100 MMHG | BODY MASS INDEX: 31.54 KG/M2 | HEIGHT: 63 IN | WEIGHT: 178 LBS | DIASTOLIC BLOOD PRESSURE: 70 MMHG

## 2025-03-17 DIAGNOSIS — Z80.3 FAMILY HISTORY OF BREAST CANCER: ICD-10-CM

## 2025-03-17 DIAGNOSIS — Z84.81 FAMILY HISTORY OF BRCA2 GENE POSITIVE: ICD-10-CM

## 2025-03-17 DIAGNOSIS — Z01.419 WOMEN'S ANNUAL ROUTINE GYNECOLOGICAL EXAMINATION: Primary | ICD-10-CM

## 2025-03-17 RX ORDER — CYCLOBENZAPRINE HCL 10 MG
10 TABLET ORAL
COMMUNITY
Start: 2024-11-26

## 2025-03-17 RX ORDER — DROSPIRENONE 4 MG/1
1 TABLET, FILM COATED ORAL DAILY
Qty: 84 TABLET | Refills: 3 | Status: SHIPPED | OUTPATIENT
Start: 2025-03-17

## 2025-06-29 ENCOUNTER — TELEMEDICINE (OUTPATIENT)
Dept: FAMILY MEDICINE CLINIC | Facility: TELEHEALTH | Age: 35
End: 2025-06-29
Payer: COMMERCIAL

## 2025-06-29 DIAGNOSIS — J01.90 ACUTE NON-RECURRENT SINUSITIS, UNSPECIFIED LOCATION: Primary | ICD-10-CM

## 2025-06-29 RX ORDER — CEFDINIR 300 MG/1
300 CAPSULE ORAL 2 TIMES DAILY
Qty: 14 CAPSULE | Refills: 0 | Status: SHIPPED | OUTPATIENT
Start: 2025-06-29 | End: 2025-07-06

## 2025-06-29 RX ORDER — ALBUTEROL SULFATE 90 UG/1
INHALANT RESPIRATORY (INHALATION)
COMMUNITY

## 2025-06-29 RX ORDER — PREDNISONE 20 MG/1
20 TABLET ORAL 2 TIMES DAILY
Qty: 10 TABLET | Refills: 0 | Status: SHIPPED | OUTPATIENT
Start: 2025-06-29 | End: 2025-07-04

## 2025-06-29 NOTE — PROGRESS NOTES
Subjective   Chief Complaint   Patient presents with    Sinusitis       Hyun Rosario is a 35 y.o. female.     History of Present Illness  Patient reports congestion, headache, sinus pressure worse behind the right eye that started within the past week.  Symptoms led to a migraine headache 2 days ago that resolved after 24 hours.  She presents today because she continues to have sinus pain and pressure that does not seem to be resolving.  Prior to onset of symptoms she has been dealing with allergies.  Sinusitis  This is a new problem. The current episode started in the past 7 days. The problem has been gradually worsening since onset. There has been no fever. Associated symptoms include congestion, headaches and sinus pressure. Pertinent negatives include no chills, coughing, diaphoresis or sore throat. Treatments tried: sudafedmegyrtec.        Allergies   Allergen Reactions    Azithromycin Hives    Amoxicillin Hives    Penicillins Hives       Past Medical History:   Diagnosis Date    Acid reflux     Depression 2006    major depressive disorder recurrent moderate    Generalized anxiety disorder 07/27/2021    H/O seasonal allergies     grass and dust    Migraines     PMS (premenstrual syndrome) 2010    bloating, hot flashes, headaches, extreme emotions, dizziness, exhaustion    TMJ (dislocation of temporomandibular joint)        Past Surgical History:   Procedure Laterality Date    WISDOM TOOTH EXTRACTION         Social History     Socioeconomic History    Marital status: Single   Tobacco Use    Smoking status: Never    Smokeless tobacco: Never   Vaping Use    Vaping status: Never Used   Substance and Sexual Activity    Alcohol use: Yes     Alcohol/week: 1.0 standard drink of alcohol     Types: 1 Drinks containing 0.5 oz of alcohol per week     Comment: rarely drink, when do never more than 2 or 3 [place]    Drug use: No    Sexual activity: Not Currently     Partners: Male     Birth control/protection: Condom, OCP        Family History   Problem Relation Age of Onset    Hypertension Father     Diabetes Father         diagnosed in     Melanoma Mother         spot removed    BRCA2 Positive Mother     Ovarian cancer Paternal Grandmother          of ovarian cancer    Breast cancer Maternal Grandmother         had it twice, once in the 80s and again in     BRCA2 Positive Maternal Grandmother          Current Outpatient Medications:     albuterol sulfate HFA (Ventolin HFA) 108 (90 Base) MCG/ACT inhaler, , Disp: , Rfl:     ALPRAZolam (XANAX) 0.5 MG tablet, , Disp: , Rfl:     cefdinir (OMNICEF) 300 MG capsule, Take 1 capsule by mouth 2 (Two) Times a Day for 7 days., Disp: 14 capsule, Rfl: 0    cetirizine (zyrTEC) 10 MG tablet, Take 1 tablet by mouth Daily., Disp: 90 tablet, Rfl: 0    cyclobenzaprine (FLEXERIL) 10 MG tablet, Take 1 tablet by mouth every night at bedtime. (Patient taking differently: Take 1 tablet by mouth every night at bedtime. As needed), Disp: , Rfl:     Drospirenone (Slynd) 4 MG tablet, Take 1 tablet by mouth Daily., Disp: 84 tablet, Rfl: 3    FLUoxetine (PROzac) 40 MG capsule, , Disp: , Rfl:     predniSONE (DELTASONE) 20 MG tablet, Take 1 tablet by mouth 2 (Two) Times a Day for 5 days., Disp: 10 tablet, Rfl: 0    Probiotic Product (PROBIOTIC DAILY PO), Take  by mouth., Disp: , Rfl:     rizatriptan (MAXALT) 10 MG tablet, , Disp: , Rfl:       Review of Systems   Constitutional:  Positive for fatigue. Negative for chills and diaphoresis.   HENT:  Positive for congestion and sinus pressure. Negative for sore throat.    Respiratory:  Negative for cough.    Neurological:  Positive for headache.        There were no vitals filed for this visit.    Objective   Physical Exam  Constitutional:       General: She is not in acute distress.     Appearance: Normal appearance. She is not ill-appearing, toxic-appearing or diaphoretic.   HENT:      Head: Normocephalic.      Nose: Congestion present.      Right Sinus:  Maxillary sinus tenderness and frontal sinus tenderness present.      Comments: Per pt       Mouth/Throat:      Lips: Pink.      Mouth: Mucous membranes are moist.   Pulmonary:      Effort: Pulmonary effort is normal.   Neurological:      Mental Status: She is alert and oriented to person, place, and time.          Procedures     Assessment & Plan   Diagnoses and all orders for this visit:    1. Acute non-recurrent sinusitis, unspecified location (Primary)  -     cefdinir (OMNICEF) 300 MG capsule; Take 1 capsule by mouth 2 (Two) Times a Day for 7 days.  Dispense: 14 capsule; Refill: 0  -     predniSONE (DELTASONE) 20 MG tablet; Take 1 tablet by mouth 2 (Two) Times a Day for 5 days.  Dispense: 10 tablet; Refill: 0            PLAN: Discussed dosing, side effects, recommended other symptomatic care.  Patient should follow up with primary care provider, Urgent Care or ER if symptoms worsen, fail to resolve or other symptoms need attention. Patient/family agree to the above.         SHEMAR Hollins     Mode of Visit: Video  Location of patient: -HOME-  Location of provider: +HOME+  You have chosen to receive care through a telehealth visit.  The patient has signed the video visit consent form.  The visit included audio and video interaction. No technical issues occurred during this visit.    The use of a video visit has been reviewed with the patient and verbal informed consent has been obtained. Myself and Hyun Rosario participated in this visit. The patient is located at [address] [address]. I am located in [place], [place]. Mychart and Zoom were utilized.        This visit was performed via Telehealth.  This patient has been instructed to follow-up with their primary care provider if their symptoms worsen or the treatment provided does not resolve their illness.

## 2025-07-09 ENCOUNTER — TELEPHONE (OUTPATIENT)
Dept: OBSTETRICS AND GYNECOLOGY | Facility: CLINIC | Age: 35
End: 2025-07-09
Payer: COMMERCIAL

## 2025-07-09 RX ORDER — FLUCONAZOLE 150 MG/1
150 TABLET ORAL DAILY
Qty: 2 TABLET | Refills: 0 | Status: SHIPPED | OUTPATIENT
Start: 2025-07-09

## 2025-07-09 NOTE — TELEPHONE ENCOUNTER
Pt aware that Rx had been sent in but that it had been sent to [facility] on [address] and pt stated that was fine because it wasn't too far from her work.

## 2025-07-09 NOTE — TELEPHONE ENCOUNTER
Hyun called she is on an antibiotic for a sinus infection which in turn has given her a yeast infection - can we her in meds for that?    [facility] [address]

## 2025-07-09 NOTE — TELEPHONE ENCOUNTER
Spoke with pt and she states that she is prone to yeast infections when she is on an antibiotic.  Pt c/o vaginal itching and white discharge.

## 2025-07-14 ENCOUNTER — TELEPHONE (OUTPATIENT)
Dept: OBSTETRICS AND GYNECOLOGY | Facility: CLINIC | Age: 35
End: 2025-07-14
Payer: COMMERCIAL

## 2025-07-14 RX ORDER — DROSPIRENONE 4 MG/1
1 TABLET, FILM COATED ORAL DAILY
Qty: 28 TABLET | Refills: 0 | COMMUNITY
Start: 2025-07-14

## 2025-07-14 NOTE — TELEPHONE ENCOUNTER
Patient uses [facility] pharmacy in [facility] for a pharm and they said that that will not have it in in time for patient and she wondered if she could get sample.  Please advise

## 2025-08-04 ENCOUNTER — PATIENT ROUNDING (BHMG ONLY) (OUTPATIENT)
Dept: URGENT CARE | Facility: CLINIC | Age: 35
End: 2025-08-04
Payer: COMMERCIAL